# Patient Record
Sex: FEMALE | Race: WHITE | Employment: OTHER | URBAN - METROPOLITAN AREA
[De-identification: names, ages, dates, MRNs, and addresses within clinical notes are randomized per-mention and may not be internally consistent; named-entity substitution may affect disease eponyms.]

---

## 2017-06-08 DIAGNOSIS — I25.10 CAD (CORONARY ARTERY DISEASE): ICD-10-CM

## 2017-06-08 RX ORDER — TRIAMTERENE AND HYDROCHLOROTHIAZIDE 37.5; 25 MG/1; MG/1
1 CAPSULE ORAL DAILY
Qty: 30 CAPSULE | Refills: 0 | Status: SHIPPED | OUTPATIENT
Start: 2017-06-08 | End: 2023-07-31 | Stop reason: ALTCHOICE

## 2018-01-04 LAB
CHOLEST SERPL-MCNC: 179 MG/DL
HDLC SERPL-MCNC: 43 MG/DL
LDLC SERPL CALC-MCNC: 95 MG/DL
NONHDLC SERPL-MCNC: NORMAL MG/DL
TRIGL SERPL-MCNC: 205 MG/DL

## 2018-06-29 ENCOUNTER — TRANSFERRED RECORDS (OUTPATIENT)
Dept: HEALTH INFORMATION MANAGEMENT | Facility: CLINIC | Age: 63
End: 2018-06-29

## 2018-08-23 LAB
ANION GAP SERPL CALCULATED.3IONS-SCNC: 11 MMOL/L
BUN SERPL-MCNC: 17 MG/DL
CALCIUM SERPL-MCNC: 11 MG/DL
CHLORIDE SERPLBLD-SCNC: 103 MMOL/L
CO2 SERPL-SCNC: 27 MMOL/L
CREAT SERPL-MCNC: 0.97 MG/DL
GFR SERPL CREATININE-BSD FRML MDRD: 59 ML/MIN/1.73M2
GLUCOSE SERPL-MCNC: 114 MG/DL (ref 65–100)
POTASSIUM SERPL-SCNC: 3.6 MMOL/L
SODIUM SERPL-SCNC: 141 MMOL/L

## 2018-08-30 ENCOUNTER — TRANSFERRED RECORDS (OUTPATIENT)
Dept: HEALTH INFORMATION MANAGEMENT | Facility: CLINIC | Age: 63
End: 2018-08-30

## 2018-08-30 ENCOUNTER — HOSPITAL ENCOUNTER (INPATIENT)
Facility: CLINIC | Age: 63
LOS: 2 days | Discharge: HOME | DRG: 287 | End: 2018-09-01
Attending: EMERGENCY MEDICINE | Admitting: INTERNAL MEDICINE
Payer: COMMERCIAL

## 2018-08-30 ENCOUNTER — DOCUMENTATION ONLY (OUTPATIENT)
Dept: CARDIOLOGY | Facility: CLINIC | Age: 63
End: 2018-08-30

## 2018-08-30 ENCOUNTER — HOSPITAL ENCOUNTER (OUTPATIENT)
Dept: NUCLEAR MEDICINE | Facility: CLINIC | Age: 63
Setting detail: NUCLEAR MEDICINE
End: 2018-08-30
Attending: INTERNAL MEDICINE
Payer: COMMERCIAL

## 2018-08-30 ENCOUNTER — HOSPITAL ENCOUNTER (OUTPATIENT)
Dept: NUCLEAR MEDICINE | Facility: CLINIC | Age: 63
Setting detail: NUCLEAR MEDICINE
Discharge: HOME | End: 2018-08-30
Attending: INTERNAL MEDICINE | Admitting: INTERNAL MEDICINE
Payer: COMMERCIAL

## 2018-08-30 ENCOUNTER — APPOINTMENT (OUTPATIENT)
Dept: GENERAL RADIOLOGY | Facility: CLINIC | Age: 63
DRG: 287 | End: 2018-08-30
Attending: EMERGENCY MEDICINE
Payer: COMMERCIAL

## 2018-08-30 DIAGNOSIS — Z95.1 HISTORY OF CORONARY ARTERY BYPASS GRAFT: ICD-10-CM

## 2018-08-30 DIAGNOSIS — E21.3 HYPERPARATHYROIDISM (H): ICD-10-CM

## 2018-08-30 DIAGNOSIS — R07.9 ACUTE CHEST PAIN: ICD-10-CM

## 2018-08-30 DIAGNOSIS — Z98.61 STATUS POST PERCUTANEOUS TRANSLUMINAL CORONARY ANGIOPLASTY: ICD-10-CM

## 2018-08-30 DIAGNOSIS — E87.6 HYPOKALEMIA: Primary | ICD-10-CM

## 2018-08-30 LAB
ALBUMIN UR-MCNC: 10 MG/DL
ANION GAP SERPL CALCULATED.3IONS-SCNC: 7 MMOL/L (ref 3–14)
APPEARANCE UR: CLEAR
APTT PPP: >240 SEC (ref 22–37)
BACTERIA #/AREA URNS HPF: ABNORMAL /HPF
BASOPHILS # BLD AUTO: 0 10E9/L (ref 0–0.2)
BASOPHILS NFR BLD AUTO: 0.6 %
BILIRUB UR QL STRIP: NEGATIVE
BUN SERPL-MCNC: 14 MG/DL (ref 7–30)
CALCIUM SERPL-MCNC: 9.6 MG/DL (ref 8.5–10.1)
CHLORIDE SERPL-SCNC: 102 MMOL/L (ref 94–109)
CO2 SERPL-SCNC: 31 MMOL/L (ref 20–32)
COLOR UR AUTO: YELLOW
CREAT SERPL-MCNC: 0.87 MG/DL (ref 0.52–1.04)
D DIMER PPP FEU-MCNC: 0.3 UG/ML FEU (ref 0–0.5)
DIFFERENTIAL METHOD BLD: NORMAL
EOSINOPHIL # BLD AUTO: 0.1 10E9/L (ref 0–0.7)
EOSINOPHIL NFR BLD AUTO: 1.4 %
ERYTHROCYTE [DISTWIDTH] IN BLOOD BY AUTOMATED COUNT: 13.1 % (ref 10–15)
ERYTHROCYTE [DISTWIDTH] IN BLOOD BY AUTOMATED COUNT: 13.1 % (ref 10–15)
GFR SERPL CREATININE-BSD FRML MDRD: 66 ML/MIN/1.7M2
GLUCOSE SERPL-MCNC: 107 MG/DL (ref 70–99)
GLUCOSE UR STRIP-MCNC: NEGATIVE MG/DL
HCT VFR BLD AUTO: 40 % (ref 35–47)
HCT VFR BLD AUTO: 40.5 % (ref 35–47)
HGB BLD-MCNC: 13.8 G/DL (ref 11.7–15.7)
HGB BLD-MCNC: 14.1 G/DL (ref 11.7–15.7)
HGB UR QL STRIP: ABNORMAL
IMM GRANULOCYTES # BLD: 0 10E9/L (ref 0–0.4)
IMM GRANULOCYTES NFR BLD: 0.2 %
INR PPP: 1.07 (ref 0.86–1.14)
INTERPRETATION ECG - MUSE: NORMAL
INTERPRETATION ECG - MUSE: NORMAL
KETONES UR STRIP-MCNC: NEGATIVE MG/DL
LEUKOCYTE ESTERASE UR QL STRIP: ABNORMAL
LYMPHOCYTES # BLD AUTO: 2.1 10E9/L (ref 0.8–5.3)
LYMPHOCYTES NFR BLD AUTO: 32.4 %
MAGNESIUM SERPL-MCNC: 2.1 MG/DL (ref 1.6–2.3)
MAGNESIUM SERPL-MCNC: 2.3 MG/DL (ref 1.6–2.3)
MCH RBC QN AUTO: 30.1 PG (ref 26.5–33)
MCH RBC QN AUTO: 30.1 PG (ref 26.5–33)
MCHC RBC AUTO-ENTMCNC: 34.5 G/DL (ref 31.5–36.5)
MCHC RBC AUTO-ENTMCNC: 34.8 G/DL (ref 31.5–36.5)
MCV RBC AUTO: 86 FL (ref 78–100)
MCV RBC AUTO: 87 FL (ref 78–100)
MONOCYTES # BLD AUTO: 0.3 10E9/L (ref 0–1.3)
MONOCYTES NFR BLD AUTO: 5.3 %
MUCOUS THREADS #/AREA URNS LPF: PRESENT /LPF
NEUTROPHILS # BLD AUTO: 3.9 10E9/L (ref 1.6–8.3)
NEUTROPHILS NFR BLD AUTO: 60.1 %
NITRATE UR QL: NEGATIVE
NRBC # BLD AUTO: 0 10*3/UL
NRBC BLD AUTO-RTO: 0 /100
NT-PROBNP SERPL-MCNC: 34 PG/ML (ref 0–900)
PH UR STRIP: 6.5 PH (ref 5–7)
PLATELET # BLD AUTO: 238 10E9/L (ref 150–450)
PLATELET # BLD AUTO: 267 10E9/L (ref 150–450)
POTASSIUM SERPL-SCNC: 3 MMOL/L (ref 3.4–5.3)
RBC # BLD AUTO: 4.58 10E12/L (ref 3.8–5.2)
RBC # BLD AUTO: 4.69 10E12/L (ref 3.8–5.2)
RBC #/AREA URNS AUTO: 2 /HPF (ref 0–2)
SODIUM SERPL-SCNC: 140 MMOL/L (ref 133–144)
SOURCE: ABNORMAL
SP GR UR STRIP: 1.02 (ref 1–1.03)
SQUAMOUS #/AREA URNS AUTO: 3 /HPF (ref 0–1)
TROPONIN I SERPL-MCNC: <0.015 UG/L (ref 0–0.04)
TROPONIN I SERPL-MCNC: <0.015 UG/L (ref 0–0.04)
TSH SERPL DL<=0.005 MIU/L-ACNC: 1.61 MU/L (ref 0.4–4)
UROBILINOGEN UR STRIP-MCNC: NORMAL MG/DL (ref 0–2)
WBC # BLD AUTO: 6.5 10E9/L (ref 4–11)
WBC # BLD AUTO: 8.9 10E9/L (ref 4–11)
WBC #/AREA URNS AUTO: 11 /HPF (ref 0–5)

## 2018-08-30 PROCEDURE — 87086 URINE CULTURE/COLONY COUNT: CPT | Performed by: INTERNAL MEDICINE

## 2018-08-30 PROCEDURE — 71046 X-RAY EXAM CHEST 2 VIEWS: CPT

## 2018-08-30 PROCEDURE — 85730 THROMBOPLASTIN TIME PARTIAL: CPT | Performed by: INTERNAL MEDICINE

## 2018-08-30 PROCEDURE — 25000128 H RX IP 250 OP 636: Performed by: INTERNAL MEDICINE

## 2018-08-30 PROCEDURE — 25000132 ZZH RX MED GY IP 250 OP 250 PS 637

## 2018-08-30 PROCEDURE — 25000132 ZZH RX MED GY IP 250 OP 250 PS 637: Performed by: EMERGENCY MEDICINE

## 2018-08-30 PROCEDURE — A9516 IODINE I-123 SOD IODIDE MIC: HCPCS | Performed by: INTERNAL MEDICINE

## 2018-08-30 PROCEDURE — 85379 FIBRIN DEGRADATION QUANT: CPT | Performed by: EMERGENCY MEDICINE

## 2018-08-30 PROCEDURE — 96376 TX/PRO/DX INJ SAME DRUG ADON: CPT

## 2018-08-30 PROCEDURE — 83735 ASSAY OF MAGNESIUM: CPT | Performed by: INTERNAL MEDICINE

## 2018-08-30 PROCEDURE — 96365 THER/PROPH/DIAG IV INF INIT: CPT

## 2018-08-30 PROCEDURE — 25000125 ZZHC RX 250: Performed by: EMERGENCY MEDICINE

## 2018-08-30 PROCEDURE — 25000128 H RX IP 250 OP 636: Performed by: EMERGENCY MEDICINE

## 2018-08-30 PROCEDURE — 83880 ASSAY OF NATRIURETIC PEPTIDE: CPT | Performed by: EMERGENCY MEDICINE

## 2018-08-30 PROCEDURE — 93005 ELECTROCARDIOGRAM TRACING: CPT

## 2018-08-30 PROCEDURE — 84443 ASSAY THYROID STIM HORMONE: CPT | Performed by: INTERNAL MEDICINE

## 2018-08-30 PROCEDURE — 21000001 ZZH R&B HEART CARE

## 2018-08-30 PROCEDURE — 84484 ASSAY OF TROPONIN QUANT: CPT | Performed by: EMERGENCY MEDICINE

## 2018-08-30 PROCEDURE — 34300033 ZZH RX 343: Performed by: INTERNAL MEDICINE

## 2018-08-30 PROCEDURE — 83735 ASSAY OF MAGNESIUM: CPT | Performed by: EMERGENCY MEDICINE

## 2018-08-30 PROCEDURE — A9500 TC99M SESTAMIBI: HCPCS | Performed by: INTERNAL MEDICINE

## 2018-08-30 PROCEDURE — 85610 PROTHROMBIN TIME: CPT | Performed by: INTERNAL MEDICINE

## 2018-08-30 PROCEDURE — 99223 1ST HOSP IP/OBS HIGH 75: CPT | Mod: AI | Performed by: INTERNAL MEDICINE

## 2018-08-30 PROCEDURE — 85027 COMPLETE CBC AUTOMATED: CPT | Performed by: INTERNAL MEDICINE

## 2018-08-30 PROCEDURE — 36415 COLL VENOUS BLD VENIPUNCTURE: CPT | Performed by: INTERNAL MEDICINE

## 2018-08-30 PROCEDURE — 99285 EMERGENCY DEPT VISIT HI MDM: CPT | Mod: 25

## 2018-08-30 PROCEDURE — 81001 URINALYSIS AUTO W/SCOPE: CPT | Performed by: INTERNAL MEDICINE

## 2018-08-30 PROCEDURE — 78072 PARATHYRD PLANAR W/SPECT&CT: CPT

## 2018-08-30 PROCEDURE — 80048 BASIC METABOLIC PNL TOTAL CA: CPT | Performed by: EMERGENCY MEDICINE

## 2018-08-30 PROCEDURE — 93010 ELECTROCARDIOGRAM REPORT: CPT | Performed by: INTERNAL MEDICINE

## 2018-08-30 PROCEDURE — 93005 ELECTROCARDIOGRAM TRACING: CPT | Mod: 76

## 2018-08-30 PROCEDURE — 25000132 ZZH RX MED GY IP 250 OP 250 PS 637: Performed by: INTERNAL MEDICINE

## 2018-08-30 PROCEDURE — 84484 ASSAY OF TROPONIN QUANT: CPT | Performed by: INTERNAL MEDICINE

## 2018-08-30 PROCEDURE — 85025 COMPLETE CBC W/AUTO DIFF WBC: CPT | Performed by: EMERGENCY MEDICINE

## 2018-08-30 RX ORDER — NITROGLYCERIN 0.4 MG/1
0.4 TABLET SUBLINGUAL EVERY 5 MIN PRN
Status: DISCONTINUED | OUTPATIENT
Start: 2018-08-30 | End: 2018-08-30

## 2018-08-30 RX ORDER — POTASSIUM CL/LIDO/0.9 % NACL 10MEQ/0.1L
10 INTRAVENOUS SOLUTION, PIGGYBACK (ML) INTRAVENOUS
Status: DISCONTINUED | OUTPATIENT
Start: 2018-08-30 | End: 2018-09-01 | Stop reason: HOSPADM

## 2018-08-30 RX ORDER — POTASSIUM CHLORIDE 7.45 MG/ML
10 INJECTION INTRAVENOUS
Status: DISCONTINUED | OUTPATIENT
Start: 2018-08-30 | End: 2018-08-30

## 2018-08-30 RX ORDER — ASPIRIN 81 MG/1
81 TABLET ORAL DAILY
Status: DISCONTINUED | OUTPATIENT
Start: 2018-08-31 | End: 2018-08-31 | Stop reason: DRUGHIGH

## 2018-08-30 RX ORDER — ONDANSETRON 4 MG/1
4 TABLET, ORALLY DISINTEGRATING ORAL EVERY 6 HOURS PRN
Status: DISCONTINUED | OUTPATIENT
Start: 2018-08-30 | End: 2018-09-01 | Stop reason: HOSPADM

## 2018-08-30 RX ORDER — MAGNESIUM SULFATE HEPTAHYDRATE 40 MG/ML
4 INJECTION, SOLUTION INTRAVENOUS EVERY 4 HOURS PRN
Status: DISCONTINUED | OUTPATIENT
Start: 2018-08-30 | End: 2018-09-01 | Stop reason: HOSPADM

## 2018-08-30 RX ORDER — NALOXONE HYDROCHLORIDE 0.4 MG/ML
.1-.4 INJECTION, SOLUTION INTRAMUSCULAR; INTRAVENOUS; SUBCUTANEOUS
Status: DISCONTINUED | OUTPATIENT
Start: 2018-08-30 | End: 2018-09-01 | Stop reason: HOSPADM

## 2018-08-30 RX ORDER — ASPIRIN 81 MG/1
243 TABLET, CHEWABLE ORAL ONCE
Status: COMPLETED | OUTPATIENT
Start: 2018-08-30 | End: 2018-08-30

## 2018-08-30 RX ORDER — TRAMADOL HYDROCHLORIDE 50 MG/1
50 TABLET ORAL AT BEDTIME
Status: DISCONTINUED | OUTPATIENT
Start: 2018-08-30 | End: 2018-08-31

## 2018-08-30 RX ORDER — POTASSIUM CHLORIDE 1500 MG/1
20-40 TABLET, EXTENDED RELEASE ORAL
Status: DISCONTINUED | OUTPATIENT
Start: 2018-08-30 | End: 2018-08-30

## 2018-08-30 RX ORDER — ACETAMINOPHEN 500 MG
500 TABLET ORAL EVERY MORNING
COMMUNITY
End: 2023-09-12

## 2018-08-30 RX ORDER — POTASSIUM CHLORIDE 1.5 G/1.58G
20-40 POWDER, FOR SOLUTION ORAL
Status: DISCONTINUED | OUTPATIENT
Start: 2018-08-30 | End: 2018-08-30

## 2018-08-30 RX ORDER — POTASSIUM CL/LIDO/0.9 % NACL 10MEQ/0.1L
10 INTRAVENOUS SOLUTION, PIGGYBACK (ML) INTRAVENOUS
Status: DISCONTINUED | OUTPATIENT
Start: 2018-08-30 | End: 2018-08-30

## 2018-08-30 RX ORDER — ROSUVASTATIN CALCIUM 20 MG/1
20 TABLET, COATED ORAL EVERY EVENING
Status: DISCONTINUED | OUTPATIENT
Start: 2018-08-30 | End: 2018-09-01 | Stop reason: HOSPADM

## 2018-08-30 RX ORDER — NITROGLYCERIN 0.4 MG/1
0.4 TABLET SUBLINGUAL EVERY 5 MIN PRN
Status: DISCONTINUED | OUTPATIENT
Start: 2018-08-30 | End: 2018-09-01 | Stop reason: HOSPADM

## 2018-08-30 RX ORDER — ACETAMINOPHEN 650 MG/1
650 SUPPOSITORY RECTAL EVERY 4 HOURS PRN
Status: DISCONTINUED | OUTPATIENT
Start: 2018-08-30 | End: 2018-09-01 | Stop reason: HOSPADM

## 2018-08-30 RX ORDER — POTASSIUM CHLORIDE 1500 MG/1
40 TABLET, EXTENDED RELEASE ORAL ONCE
Status: COMPLETED | OUTPATIENT
Start: 2018-08-30 | End: 2018-08-30

## 2018-08-30 RX ORDER — METOPROLOL SUCCINATE 50 MG/1
50 TABLET, EXTENDED RELEASE ORAL DAILY
Status: DISCONTINUED | OUTPATIENT
Start: 2018-08-31 | End: 2018-09-01 | Stop reason: HOSPADM

## 2018-08-30 RX ORDER — ONDANSETRON 2 MG/ML
4 INJECTION INTRAMUSCULAR; INTRAVENOUS EVERY 6 HOURS PRN
Status: DISCONTINUED | OUTPATIENT
Start: 2018-08-30 | End: 2018-09-01 | Stop reason: HOSPADM

## 2018-08-30 RX ORDER — POTASSIUM CHLORIDE 7.45 MG/ML
10 INJECTION INTRAVENOUS
Status: DISCONTINUED | OUTPATIENT
Start: 2018-08-30 | End: 2018-09-01 | Stop reason: HOSPADM

## 2018-08-30 RX ORDER — LIDOCAINE 40 MG/G
CREAM TOPICAL
Status: DISCONTINUED | OUTPATIENT
Start: 2018-08-30 | End: 2018-09-01 | Stop reason: HOSPADM

## 2018-08-30 RX ORDER — CLOPIDOGREL BISULFATE 75 MG/1
75 TABLET ORAL EVERY EVENING
Status: DISCONTINUED | OUTPATIENT
Start: 2018-08-30 | End: 2018-09-01 | Stop reason: HOSPADM

## 2018-08-30 RX ORDER — ROSUVASTATIN CALCIUM 20 MG/1
20 TABLET, COATED ORAL EVERY EVENING
COMMUNITY
End: 2018-09-17

## 2018-08-30 RX ORDER — ASPIRIN 325 MG
325 TABLET ORAL ONCE
Status: DISCONTINUED | OUTPATIENT
Start: 2018-08-30 | End: 2018-08-30

## 2018-08-30 RX ORDER — TRAMADOL HYDROCHLORIDE 50 MG/1
50 TABLET ORAL AT BEDTIME
COMMUNITY
End: 2024-08-06

## 2018-08-30 RX ORDER — POTASSIUM CHLORIDE 1.5 G/1.58G
20-40 POWDER, FOR SOLUTION ORAL
Status: DISCONTINUED | OUTPATIENT
Start: 2018-08-30 | End: 2018-09-01 | Stop reason: HOSPADM

## 2018-08-30 RX ORDER — ASPIRIN 81 MG/1
TABLET, CHEWABLE ORAL
Status: COMPLETED
Start: 2018-08-30 | End: 2018-08-30

## 2018-08-30 RX ORDER — ACETAMINOPHEN 500 MG
500 TABLET ORAL EVERY MORNING
Status: DISCONTINUED | OUTPATIENT
Start: 2018-08-31 | End: 2018-09-01 | Stop reason: HOSPADM

## 2018-08-30 RX ORDER — CLOPIDOGREL BISULFATE 75 MG/1
75 TABLET ORAL EVERY EVENING
COMMUNITY

## 2018-08-30 RX ORDER — POTASSIUM CHLORIDE 1500 MG/1
20-40 TABLET, EXTENDED RELEASE ORAL
Status: DISCONTINUED | OUTPATIENT
Start: 2018-08-30 | End: 2018-09-01 | Stop reason: HOSPADM

## 2018-08-30 RX ORDER — POTASSIUM CHLORIDE 29.8 MG/ML
20 INJECTION INTRAVENOUS
Status: DISCONTINUED | OUTPATIENT
Start: 2018-08-30 | End: 2018-09-01 | Stop reason: HOSPADM

## 2018-08-30 RX ORDER — POTASSIUM CHLORIDE 750 MG/1
10 TABLET, EXTENDED RELEASE ORAL EVERY OTHER DAY
Status: ON HOLD | COMMUNITY
End: 2018-09-01

## 2018-08-30 RX ORDER — ACETAMINOPHEN 325 MG/1
650 TABLET ORAL EVERY 4 HOURS PRN
Status: DISCONTINUED | OUTPATIENT
Start: 2018-08-30 | End: 2018-09-01 | Stop reason: HOSPADM

## 2018-08-30 RX ORDER — METOPROLOL SUCCINATE 50 MG/1
50 TABLET, EXTENDED RELEASE ORAL DAILY
COMMUNITY
End: 2018-09-17

## 2018-08-30 RX ORDER — MAGNESIUM SULFATE HEPTAHYDRATE 40 MG/ML
4 INJECTION, SOLUTION INTRAVENOUS EVERY 4 HOURS PRN
Status: DISCONTINUED | OUTPATIENT
Start: 2018-08-30 | End: 2018-08-30

## 2018-08-30 RX ORDER — POTASSIUM CHLORIDE 29.8 MG/ML
20 INJECTION INTRAVENOUS
Status: DISCONTINUED | OUTPATIENT
Start: 2018-08-30 | End: 2018-08-30

## 2018-08-30 RX ADMIN — POTASSIUM CHLORIDE 40 MEQ: 1500 TABLET, EXTENDED RELEASE ORAL at 17:01

## 2018-08-30 RX ADMIN — ASPIRIN 81 MG 243 MG: 81 TABLET ORAL at 15:37

## 2018-08-30 RX ADMIN — ROSUVASTATIN CALCIUM 20 MG: 20 TABLET, FILM COATED ORAL at 22:38

## 2018-08-30 RX ADMIN — NITROGLYCERIN 0.4 MG: 0.4 TABLET SUBLINGUAL at 16:03

## 2018-08-30 RX ADMIN — Medication 955 UCI.: at 11:02

## 2018-08-30 RX ADMIN — TRAMADOL HYDROCHLORIDE 50 MG: 50 TABLET, COATED ORAL at 22:38

## 2018-08-30 RX ADMIN — ASPIRIN 243 MG: 81 TABLET, CHEWABLE ORAL at 15:37

## 2018-08-30 RX ADMIN — LIDOCAINE HYDROCHLORIDE 30 ML: 20 SOLUTION ORAL; TOPICAL at 19:09

## 2018-08-30 RX ADMIN — Medication 26.7 MILLICURIE: at 13:13

## 2018-08-30 RX ADMIN — HEPARIN SODIUM 700 UNITS/HR: 10000 INJECTION, SOLUTION INTRAVENOUS at 20:38

## 2018-08-30 RX ADMIN — Medication 3600 UNITS: at 20:39

## 2018-08-30 RX ADMIN — CLOPIDOGREL 75 MG: 75 TABLET, FILM COATED ORAL at 22:38

## 2018-08-30 ASSESSMENT — ENCOUNTER SYMPTOMS
DIAPHORESIS: 0
SHORTNESS OF BREATH: 0
NAUSEA: 0

## 2018-08-30 ASSESSMENT — PAIN DESCRIPTION - DESCRIPTORS: DESCRIPTORS: DISCOMFORT

## 2018-08-30 NOTE — PROGRESS NOTES
RECEIVING UNIT ED HANDOFF REVIEW    ED Nurse Handoff Report was reviewed by: Nelly Barton on August 30, 2018 at 6:41 PM

## 2018-08-30 NOTE — PHARMACY-ADMISSION MEDICATION HISTORY
Admission Medication History    Admission medication history interview status for the 8/30/2018 admission is complete. See EPIC admission navigator for prior to admission medications     Medication history source reliability:Good    Actions taken by pharmacist (provider contacted, etc):None     Additional medication history information not noted on PTA med list :None    Medication reconciliation/reorder completed by provider prior to medication history? No    Time spent in this activity: 15 minutes      Prior to Admission medications    Medication Sig Last Dose Taking? Auth Provider   acetaminophen (TYLENOL) 500 MG tablet Take 500 mg by mouth every morning 8/30/2018 at AM Yes Unknown, Entered By History   aspirin 81 MG tablet Take 81 mg by mouth daily 8/30/2018 at AM Yes Unknown, Entered By History   clopidogrel (PLAVIX) 75 MG tablet Take 75 mg by mouth every evening 8/29/2018 at PM Yes Unknown, Entered By History   furosemide (LASIX) 20 MG tablet Take 40 mg by mouth daily  8/30/2018 at AM Yes Reported, Patient   metoprolol succinate (TOPROL-XL) 50 MG 24 hr tablet Take 50 mg by mouth daily 8/30/2018 at AM Yes Unknown, Entered By History   potassium chloride (K-TAB,KLOR-CON) 10 MEQ tablet Take 10 mEq by mouth every other day in the PM 8/28/2018 at PM Yes Unknown, Entered By History   rosuvastatin (CRESTOR) 20 MG tablet Take 20 mg by mouth every evening  8/29/2018 at PM Yes Unknown, Entered By History   traMADol (ULTRAM) 50 MG tablet Take 50 mg by mouth At Bedtime  Yes Unknown, Entered By History   triamterene-hydrochlorothiazide (DYAZIDE) 37.5-25 MG per capsule Take 1 capsule by mouth daily 8/29/2018 at PM Yes Tony Mcconnell MD David S. Cline, PharmD

## 2018-08-30 NOTE — H&P
[facility]    History and Physical  Hospitalist       Date of Admission:  8/30/2018    Assessment & Plan   Radhika Reynolds is a 62 year old female wit hx of complex CAD (s/p multiple stenting before and after IV CABG), HTN, intermitent hypokalemia, new dx of hypercalcemia/hyperparathyroidism, Thorasic spinal compression fx (on ultram at bedtime) ,GERD who presents with new onset chest pain.     Principal Problem:    Acute chest pain  Coronary artery disease    Assessment: complex CAD dz hx. Per pt hx of cardiac stenting 2011 ([facility]), 2012 ([facility]), 1V CABG ([facility]) and 3 stents in [place]. Had rest night cp similar to today's presentation with all episodes.   Now presents with intermitent rest pain at night and today with constant fluctuating pain all day.  Troponin negative.   Work up thus far negative  Given hx possible ACS but need to consider GERD.  Some improvement with GI cocktail just given and pain eventually resolved.   I suspect this is not cardiac but will treat for possible ACS given complex cardiac hx and pain same as on prior ACS presentations  No contraindications to heparin      Plan:   Admit in patient [facility]  Tele  Heparin gtt for now - stop if rules out for AMI  Lexiscan tomorrow  bber  Asa, plavix  Statin  Am lipids  Repeat ekg  If not completely resolve with gi cocktail will try sl ntg    Gerd: told by pcp recently to restart ppi. Has night time gerd different from her cp.   Some improvement with cp now in ED with gi cocktail.     Start omeprezole given pt on plavix          Hyperlipidemia    Assessment: on statin    Plan: cont statin, am lipiids, goal LDL <70      Essential hypertension, benign    Assessment: good control now and in recent PCP office visit. On PTA toprol xl 50mg every day, dyazide (and lasix - lasix may be for htn or leg jd- not clear. No clear hx of chf)    Plan: cont toprol xl.   Restart dyazide, replace k        Hypokalemia    Assessment: hx of intermitent hypokalemia  in past- even had hospitalization for this in past. On dyazide AND lasix. On kcl 10meq every other day but has been on every day in past.     Plan:  K and Mg protocol  Check Mg  Hold lasix  Replace K  Increase outpatient dosing kcl to every day -consider increase dose to 20meq every day.  Close pcp follow up.       Possible UTI  +symptoms. UA slightly abnl, ++ subcutaneous epi   ucx  Start cipro    DVT Prophylaxis: Pneumatic Compression Devices    Code Status: Full Code    Disposition: Expected discharge in 1-2 days once cp evaluation completed.     Giorgio Bowen MD    Primary Care Physician   = Carlos Edwards    Chief Complaint   Chest pain    History is obtained from the patient    History of Present Illness   Radhika Reynolds is a 62 year old female wit hx of complex CAD (s/p multiple stenting before and after IV CABG), HTN, intermitent hypokalemia, new dx of hypercalcemia/hyperparathyroidism, Thorasic spinal compression fx (on ultram at bedtime) ,GERD who presents with new onset chest pain.     Cardiac hx: see cardiology note dated 8/2015.   history of coronary artery disease and s/p drug-eluting stent placement to the LAD and obtuse marginal.  In 02/2013 s/p minimally invasive CABG x1 with LIMA to the LAD - complicated postoperatively by significant chest discomfort, consistent with a post-pericardiotomy syndrome -she was treated with colchicine, steroids and aspirin.      Pt states she has been doing well since cardiac stents placed in [place] in 2/2018 until two days ago. She states that the night before last she was awaken with moderate left sided chest pain that was sharp and pressure like that resolved after 1 hour spontaneously. No associated symptoms of sob, nausea, diaphoresis, palpitions, gerd or abd pain. No radiation.  The following day, yesterday, she felt fine. The chest pain was similar to the cp she has had in past when she has ultimately had cardiac stenting and before her CABG.   Last night  she was awoken X 2. First episode she had her typical gerd and also at same time the cp she had last night before. She took and OTC med (gavisol) for her gerd and both pains resolved about one hour later. She was awoken again later in the night with her cp which is different from her GERD. This resolved spontaneously or she fell asleep again. She awoke this am with her chest pain (non gerd pain) which has been constant since all day, fluctuating in intensity. Not worse with position changes. No associated n/diaphoresis, sob. No radiation. Not worse with excertion.  She was having a parathyroid gland NM scan today at [facility] and given symptoms she decided to present to ED.   In ED, she was moderatly hypertensive, afebrile. Nl sats. Nl exam. ekg without acute changes suggestive of ischemia.  K 3.0. Cbc nl. ddimer nl.  cxr normal. Troponin nl.   Her pain was improved with SL ntg. She has had mild persistence of her cp in the ED.       Past Medical History    I have reviewed this patient's medical history and updated it with pertinent information if needed.   Past Medical History:   Diagnosis Date     Chest pain      Coronary artery disease     CABG 3/15/2013: CARTY to LAD, cath 2/12/2012: MAXIMO to LAD and MAXIMO to OM     Dyslipidemia      Gastro-oesophageal reflux disease      High cholesterol      History of angina      Hypertension      Hypokalemia      Pericarditis, acute March 2013    unspecified       Past Surgical History   I have reviewed this patient's surgical history and updated it with pertinent information if needed.  Past Surgical History:   Procedure Laterality Date     BIOPSY       CARDIAC SURGERY       CARDIAC SURGERY       CORONARY ARTERY BYPASS  3/15/2013     DAVINCI BYPASS ARTERY CORONARY  3/15/2013    Procedure: DAVINCI BYPASS ARTERY CORONARY;  Davinci Assisted  Coronary Artery Bypass Graft X 1 using Left Internal Mammary Artery off pump;  Surgeon: Alejandro Mcclendon MD;  Location: [facility] OR     GYN  SURGERY       HEART CATH, ANGIOPLASTY  2/2012    MAXIMO to the LAD and obtuse marginal     LIPOSUCTION (LOCATION)         Prior to Admission Medications   Prior to Admission Medications   Prescriptions Last Dose Informant Patient Reported? Taking?   acetaminophen (TYLENOL) 500 MG tablet 8/30/2018 at AM  Yes Yes   Sig: Take 500 mg by mouth every morning   aspirin 81 MG tablet 8/30/2018 at AM Self Yes Yes   Sig: Take 81 mg by mouth daily   clopidogrel (PLAVIX) 75 MG tablet 8/29/2018 at PM Self Yes Yes   Sig: Take 75 mg by mouth every evening   furosemide (LASIX) 20 MG tablet 8/30/2018 at AM Self Yes Yes   Sig: Take 40 mg by mouth daily    metoprolol succinate (TOPROL-XL) 50 MG 24 hr tablet 8/30/2018 at AM Self Yes Yes   Sig: Take 50 mg by mouth daily   potassium chloride (K-TAB,KLOR-CON) 10 MEQ tablet 8/28/2018 at PM Self Yes Yes   Sig: Take 10 mEq by mouth every other day in the PM   rosuvastatin (CRESTOR) 20 MG tablet 8/29/2018 at PM Self Yes Yes   Sig: Take 20 mg by mouth every evening    traMADol (ULTRAM) 50 MG tablet   Yes Yes   Sig: Take 50 mg by mouth At Bedtime   triamterene-hydrochlorothiazide (DYAZIDE) 37.5-25 MG per capsule 8/29/2018 at PM Self No Yes   Sig: Take 1 capsule by mouth daily      Facility-Administered Medications: None     Allergies   Allergies   Allergen Reactions     Amoxicillin      Lisinopril Cough     Norvasc [Amlodipine]      Penicillin G        Social History   I have reviewed this patient's social history and updated it with pertinent information if needed. Radhika Reynolds  reports that she quit smoking about 5 years ago. Her smoking use included Cigarettes. She has a 7.50 pack-year smoking history. She does not have any smokeless tobacco history on file. She reports that she does not drink alcohol or use illicit drugs.    Family History   I have reviewed this patient's family history and updated it with pertinent information if needed.   Family History   Problem Relation Age of Onset      Hypertension Mother      Other - See Comments Mother      back problems     Myocardial Infarction Father        Review of Systems   The 10 point Review of Systems is negative other than noted in the HPI or here.     Physical Exam   Temp: 97.7  F (36.5  C) Temp src: Temporal BP: 126/65   Heart Rate: 55 Resp: 11 SpO2: 99 % O2 Device: None (Room air)    Vital Signs with Ranges  Temp:  [97.7  F (36.5  C)] 97.7  F (36.5  C)  Heart Rate:  [55-70] 55  Resp:  [11-18] 11  BP: ()/() 126/65  SpO2:  [96 %-100 %] 99 %  163 lbs 0 oz    Constitutional: laying in bed, appears comfortable. Nad, non toxic   Eyes: perrla, eomi, nl sclera  Neck NT, nlthyroid  HEENT: nl op  Respiratory: breathing easily, CTAB  Chest: no reproduction of cp with palpation  Cardiovascular: RRR no r/g/m, not tachy  No edema  Nl pedal pulses  GI: soft, nd. ? Mild tenderness over suprapubic region. No guarding  Lymph/Hematologic: no axillary or inguinal lad    Skin: no rash  Musculoskeletal: no focal spine pain. No focal jt swelling or redness.   Neurologic: CN 2-12 grossly intact,. Nl speech and mentation. Strength 5/5 extrem X 4. No clonus. Toes down going bilaterally. Nl dorso./plantar flexion  Psychiatric:nl affect    Data   Data reviewed today:  I personally reviewed imaging and labs and ekg   Ekg: reviewed. NSR. Nl intervals. No ST-T wave changes    Recent Labs  Lab 08/30/18  1525   WBC 6.5   HGB 13.8   MCV 87         POTASSIUM 3.0*   CHLORIDE 102   CO2 31   BUN 14   CR 0.87   ANIONGAP 7   ADONIS 9.6   *   TROPI <0.015       Imaging:  Recent Results (from the past 24 hour(s))   NM Parathy Plnr Img w True Spect & CT    Narrative    NUCLEAR MEDICINE PARATHYROID DUAL ISOTOPE WITH TRUE (SPECT) AND CT   8/30/2018 3:08 PM    HISTORY:  Hyperparathyroidism (H).    COMPARISON: None.    TECHNIQUE:  The patient received I-123 orally and Tc-99 Cardiolite  injected intravenously. Dynamic images were obtained of the thyroid  gland  following Cardiolite administration. Iodine images were obtained  of the thyroid with subtraction from the Cardiolite images. SPECT CT  images obtained for possible localization.    DOSE: 955uCi I-123 Caps P.O. @1055, 26.7mCi Tc99m Sestamibi inj Rt  Hand    FINDINGS:  The images of the thyroid gland on both the I-123 and  Cardiolite images demonstrate homogeneous appearance of the thyroid  gland. The subtraction images demonstrate no persistent area of  increased radioisotope uptake to indicate a functioning parathyroid  adenoma.    Severe coronary atherosclerosis is noted.      Impression    IMPRESSION:  No evidence of persistent radioisotope uptake on  subtraction imaging to indicate a functioning parathyroid adenoma.  Severe coronary atherosclerosis noted.    RANDALL PUENTE MD   XR Chest 2 Views    Narrative    CHEST TWO VIEWS 8/30/2018 5:12 PM     HISTORY: Chest pain.      COMPARISON: August 16, 2015     FINDINGS: There are no acute infiltrates. The cardiac silhouette is  not enlarged. Pulmonary vasculature is unremarkable.      Impression    IMPRESSION: No acute disease.    CLIFF PAGAN MD

## 2018-08-30 NOTE — ED PROVIDER NOTES
"  History     Chief Complaint:  Chest Pain    The history is provided by the patient.     Radhika Reynolds is a 62 year old female with a history of CAD s/p CABG 2013 and multiple stents (last 3 placed February 2018) on 81 mg ASA and Plavix and GERD who presents for evaluation of chest pain. Patient reports she has been doing well since stent placement 6 months ago. However, she awoke from sleep with chest pain 2 nights ago. She reports her anginal pain is typically at rest and typically at night. However, she thought this may have been heartburn because it improved spontaneously. She then had 2 episodes last night of awakening due to chest pain. She did not awake with pain this morning and proceeded to the hospital for parathyroid testing for hypercalcemia. She was concerned when initial BP was high at 165/83, though this improved after the scan. She reports at some point she developed chest pain again (initially she states it was when walking to her car then later states she had pain prior to exerting herself walking to the car). She reports the pain is in the left chest and pressure-like in quality. Currently mild. It radiates to her left scapula with inspiration. Because she felt \"something was wrong\" she went to her cardiologist's office who recommended she come to the ER. It is of note that patient has not seen this cardiologist in 3 years as her recent tests and procedures have been performed in [place] ([facility] [place]). She denies associated diaphoresis, nausea, vomiting, or dyspnea. No recent travel (in [place] May- October).     Allergies:  Amoxicillin  Lisinopril  Norvasc  Penicillin G     Medications:    Aspirin  Plavix  Lasix  Metoprolol succinate  Potassium chloride  Crestor  Dyazide    Past Medical History:    Coronary artery disease  Hyperlipidemia  Gastro-oseophageal reflux disease  Angina  Hypertension  Hypokalemia  Pericarditis, acute    Past Surgical History:  " "  Biopsy  Cardiac surgery   Davinci bypass artery coronary  GYN surgery  Heart cath, angioplasty, MAXIMO to the LED and obtuse marginal  Liposuction    Family History:    Hypertension  Myocardial infarction     Social History:  Relationship status:   Tobacco use:  Former, 0.25 PPD. Quit date 3/23/2013  Alcohol use: No  The patient presents with .   Marital Status:       Review of Systems   Constitutional: Negative for diaphoresis.   Respiratory: Negative for shortness of breath.    Cardiovascular: Positive for chest pain.   Gastrointestinal: Negative for nausea and vomiting.   All other systems reviewed and are negative.    Physical Exam     Patient Vitals for the past 24 hrs:   BP Temp Temp src Heart Rate Resp SpO2 Height Weight   08/30/18 1730 - - - 55 - 99 % - -   08/30/18 1723 126/65 - - - - - - -   08/30/18 1700 98/77 - - 61 - 97 % - -   08/30/18 1630 113/71 - - 60 11 97 % - -   08/30/18 1615 111/68 - - 62 - 96 % - -   08/30/18 1600 135/84 - - 61 11 99 % - -   08/30/18 1530 (!) 125/109 - - 62 - 100 % - -   08/30/18 1515 (!) 152/93 - - 58 - 99 % - -   08/30/18 1445 161/89 97.7  F (36.5  C) Temporal 70 18 100 % 1.575 m (5' 2\") 73.9 kg (163 lb)     Physical Exam  General: Well-developed and well-nourished. Well appearing middle aged woman. Cooperative.  Head:  Atraumatic.  Eyes:  Conjunctivae, lids, and sclerae are normal.  ENT:    Normal nose. Moist mucous membranes.  Neck:  Supple. Normal range of motion.  CV:  Regular rate and rhythm. Normal heart sounds with no murmurs, rubs, or gallops detected. 2+ DP bilaterally.  Resp:  No respiratory distress. Clear to auscultation bilaterally without decreased breath sounds, wheezing, rales, or rhonchi.  GI:  Soft. Non-distended. Non-tender.    MS:  Normal ROM. No bilateral lower extremity edema or calf tenderness..  Skin:  Warm. Non-diaphoretic. No pallor.  Neuro:  Awake. A&Ox3. Normal strength.  Psych: Normal mood and affect. Normal speech.  Vitals " reviewed.    Emergency Department Course     EKG #1 of 2  Indication: Chest Pain  Time: 1444  Rate 65 bpm. MN interval 198. QRS duration 82. QT/QTc 404/420.   Normal sinus rhythm   Cannot rule out Anterior infarct, age undetermined  No acute ST changes.  No significant change as compared to prior, dated 8/16/15     EKG #2 of 2  Indication: Chest Pain  Time: 1552  Rate 57 bpm. MN interval 216. QRS duration 92. QT/QTc 436/424.   Sinus bradycardia with 1st degree AV block  Septal infarct, age undetermined  No acute ST changes.  No significant change as compared to prior, dated 8/30/18    Imaging:  Radiology findings were communicated with the patient who voiced understanding of the findings.  XR Chest 2 Views  IMPRESSION:  No acute disease.  Reading per radiology.     Laboratory:  Laboratory findings were communicated with the patient who voiced understanding of the findings.  BNP: 34  Troponin (Collected 1525): <0.015  BMP: Potassium: 3.0(L), Glucose: 107(H) o/w WNL (Creatinine 0.87)   D Dimer (Collected 1525): 0.3   CBC: AWNL (WBC 6.5, HGB 13.8, )  Magnesium: 2.1    Interventions:  1537 Aspirin 243 mg Oral  1603 Nitroglycerin 0.4 mg Sublingual  1701 Potassium chloride 40 mEq Oral     Emergency Department Course:  Nursing notes and vitals reviewed.  I performed an exam of the patient as documented above.   EKG obtained in the ED, see results above.    IV was inserted and blood was drawn for laboratory testing, results above.  The patient was sent for a XR while in the emergency department, results above.     1728 I rechecked the patient. Reports pain improved after nitroglycerin.  1747 I spoke with Dr. Bowen, hospitalist, regarding the patient.  1930 I rechecked the patient. Pain again somewhat improved, though not resolved, after GI cocktail.    I discussed the treatment plan with the patient. She expressed understanding of this plan and consented to admission. I discussed the patient with Dr. Bowen, who  will admit the patient to a monitored bed for further evaluation and treatment.   I personally reviewed the laboratory and imaging results with the patient and answered all related questions prior to admission.      HEART Score  Background  Calculates the overall risk of adverse event in patient's presenting with chest pain.  Based on 5 criteria (each assigned 0-2 points) including suspiciousness of history, EKG, age, risk factors and troponin.    Data  62 year old female  Chest pain of pericarditis  Hyperlipidemia  Essential hypertension, benign  Coronary artery disease  Precordial pain  Acute pericarditis  Personal history of tobacco use, presenting hazards to health  Former smoker  FHx includes father with MI  Lab Results   Component Value Date    TROPI <0.015 08/30/2018     Criteria   0-2 points for each of 5 items (maximum of 10 points):  Score 0- History slightly suspicious for coronary syndrome   Score 1- EKG with Non-specific repolarization disturbance  Score 1- Age 45 to 65 years old  Score 2- Three or more risk factors for or history of atherosclerotic disease  Score 0- Within normal limits for troponin levels  Interpretation  Risk of adverse outcome  Heart Score: 4  Total Score 4-6- Adverse Outcome Risk 20.3% - Supports admission with standard rule-out management -serial troponins and stress testing    Impression & Plan      Medical Decision Making:  Radhika is a 62-year-old female with a strong history of coronary artery disease with a total of 6 stents (last 3 placed 6 months ago) and CABG who presents with chest pain.  Patient states her typical angina occurs at rest and typically at night.  She has had some chest pain over the last 2 nights that resolved spontaneously.  However, today she has had pain in her left chest and radiating to her left scapula, worse with inspiration, prompting her visit.  On exam, she is well-appearing and has no focal physical exam findings.  EKG has some nonspecific changes  that are grossly unchanged from prior dated 2015.  Patient was given the remainder of a full dose of aspirin and 1 nitroglycerin.  She states her pain improved with nitroglycerin and declined need for further.  Chest x-ray is unremarkable with no cause of her left-sided chest pain identified.  Her laboratory studies are overall reassuring with no leukocytosis or anemia.  She has a mild hypokalemia to 3.0 which was repleted with 40 mEq potassium chloride by mouth.  Creatinine normal at 0.87.  D-dimer is 0.3.  This negative value makes pulmonary embolus as the etiology of her pain highly unlikely.  Finally, troponin is negative which is reassuring for ACS.  Given patient's strong history of coronary artery disease with recurrent left sided chest pain, relieved with nitro, I am concerned for unstable angina.  Her HEART score is 4 and I believe she warrants admission to the hospital for further workup and treatment.  It is of note that patient states she had a stress test within the last 6 months.  However, I attempted to get this information from Care Everywhere and was unable to do so. I discussed patient's case with Dr. Bowen, hospitalist, who accepts admission.  He evaluated the patient in the emergency department and her pain had returned by that point.  We tried a GI cocktail as she describes it as somewhat burning in nature and this minimally improved her pain.  She reports chest pain seems to be waxing and waning. Thus, per Dr. Bowen's recommendation, she was started on a heparin drip given her CAD history.  I updated patient on the results of her laboratory and imaging studies and answered all her questions.  She verbalized understanding of these findings as well as plan for admission.  All of her questions were answered.    Diagnosis:    ICD-10-CM    1. Acute chest pain R07.9    2. History of coronary artery bypass graft Z95.1    3. Status post percutaneous transluminal coronary angioplasty Z98.61        Disposition:   Admitted to [facility].    CMS Diagnoses: None     Scribe Disclosure:  I, Inés Luis, am serving as a scribe at 3:17 PM on 8/30/2018 to document services personally performed by Pallavi Mackey MD, based on my observations and the provider's statements to me.     Inés Smith  8/30/2018    EMERGENCY DEPARTMENT       Pallavi Mackey MD  08/31/18 0052

## 2018-08-30 NOTE — ED NOTES
[facility]  ED Nurse Handoff Report    ED Chief complaint: Chest Pain (Past few days. Radiates to her back.  3 stents placed in [place] 6 months ago.  )      ED Diagnosis:   Final diagnoses:   Acute chest pain   History of coronary artery bypass graft   Status post percutaneous transluminal coronary angioplasty       Code Status: Full Code    Allergies:   Allergies   Allergen Reactions     Amoxicillin      Lisinopril Cough     Norvasc [Amlodipine]      Penicillin G        Activity level - Baseline/Home:  Independent    Activity Level - Current:   Independent     Needed?: No    Isolation: No  Infection: Not Applicable  Bariatric?: No    Vital Signs:   Vitals:    08/30/18 1630 08/30/18 1700 08/30/18 1723 08/30/18 1730   BP: 113/71 98/77 126/65    Resp: 11      Temp:       TempSrc:       SpO2: 97% 97%  99%   Weight:       Height:           Cardiac Rhythm: ,        Pain level: 0-10 Pain Scale: 5    Is this patient confused?: No   [place] - Suicide Severity Rating Scale Completed?  Yes  If yes, what color did the patient score?  White    Patient Report: Initial Complaint: chest pain  Focused Assessment: Chest pain for the past two night, hx 6 stents, CABG in 2013, GERD, Hypokalemia, CAD and HTN.  Last night the pain woke her twice, thinking it was indigstion, was seen today for her thyroid scan, left her appointment and felt that something was wrong, spoke with cardiology clinic and was instructed to ED.  Pain radiates from left chest to left shoulder blade, no N/V or diaphoresis.  Denies SOB.  Some of her stents were placed in a [place] hospital others done here.  PIV 18 G right AC.  Tests Performed: CXR, labs, EKG  Abnormal Results: see results  Treatments provided:  mg, nitroglycerin x1 SL    Family Comments:  at bedside    OBS brochure/video discussed/provided to patient: yes    ED Medications:   Medications   nitroGLYcerin (NITROSTAT) sublingual tablet 0.4 mg (0.4 mg  Sublingual Given 8/30/18 1603)   aspirin chewable tablet 243 mg (243 mg Oral Given 8/30/18 1537)   potassium chloride SA (K-DUR/KLOR-CON M) CR tablet 40 mEq (40 mEq Oral Given 8/30/18 1701)       Drips infusing?:  No    For the majority of the shift this patient was Green.   Interventions performed were none.    Severe Sepsis OR Septic Shock Diagnosis Present: No      ED NURSE PHONE NUMBER: [phone]

## 2018-08-30 NOTE — IP AVS SNAPSHOT
[facility]    [address] [place], [place] [address]e., [address]    [place] [address]    Phone:  [phone]                                       After Visit Summary   8/30/2018    Radhika Reynolds    MRN: [id number]           After Visit Summary Signature Page     I have received my discharge instructions, and my questions have been answered. I have discussed any challenges I see with this plan with the nurse or doctor.    ..........................................................................................................................................  Patient/Patient Representative Signature      ..........................................................................................................................................  Patient Representative Print Name and Relationship to Patient    ..................................................               ................................................  Date                                            Time    ..........................................................................................................................................  Reviewed by Signature/Title    ...................................................              ..............................................  Date                                                            Time          22EPIC Rev 08/18

## 2018-08-30 NOTE — IP AVS SNAPSHOT
MRN:[id number]                      After Visit Summary   8/30/2018    Radhika Reynolds    MRN: [id number]           Thank you!     Thank you for choosing [facility] for your care. Our goal is always to provide you with excellent care. Hearing back from our patients is one way we can continue to improve our services. Please take a few minutes to complete the written survey that you may receive in the mail after you visit with us. Thank you!        Patient Information     Date Of Birth          1955        Designated Caregiver       Most Recent Value    Caregiver    Will someone help with your care after discharge? yes    Name of designated caregiver Constantino Reynolds [spouse]    Phone number of caregiver [phone]    Caregiver address [place]      About your hospital stay     You were admitted on:  August 30, 2018 You last received care in the:  [facility]    You were discharged on:  September 1, 2018       Who to Call     For medical emergencies, please call 911.  For non-urgent questions about your medical care, please call your primary care provider or clinic, [phone]          Attending Provider     Provider Specialty    Pallavi Mackey MD Emergency Medicine    [facility], Giorgio Thakkar MD Internal Medicine       Primary Care Provider Office Phone # Fax #    Carlos Edwards [phone]      After Care Instructions     Diet       Follow this diet upon discharge: Orders Placed This Encounter      Kosher Diet, < 2g sodium daily                  Follow-up Appointments     Follow-up and recommended labs and tests        Keep previously scheduled cardiology appointments.  F/U with PCP in one week.                  Your next 10 appointments already scheduled     Sep 06, 2018  1:30 PM CDT   CONSULT with Dakotah Ingram MD   [facility] [place] [facility] [place]    [address], [address]  [place] [address]    [phone]              Additional Services     Follow-Up with Cardiac Advanced Practice Provider                 Further instructions from your care team       Going Home after  ANGIOGRAM       Name: Radhika Reynolds  Medical Record Number:  [id number]  Today's Date: September 1, 2018        For 24 hours:         Have an adult stay with you for 24 hours.         Relax and take it easy.         Drink plenty of fluids.         You may eat your normal diet, unless your doctor tells you otherwise.         Do NOT make any important or legal decisions.         Do NOT drive or operate machines at home or at work.         Do NOT drink alcohol.      Do NOT smoke.     Medicines:         If you have begun Plavix (clopidogrel), Effient (prasugrel), or Brilinta (ticagrelor), do not stop taking it until you talk to your heart doctor (cardiologist).         If you are on metformin (Glucophage), do not restart it until you have blood tests (within 2 to 3 days after discharge). When your doctor tells you it is safe, you may restart the metformin.         If you have stopped any other medicines, check with your nurse or provider about when to restart them.    Care of right groin site:         Remove the Band-Aid after 24 hours. If there is minor oozing, apply another Band-aid and remove it after 12 hours.          Do NOT take a bath, or use a hot tub or pool for at least 3 days. You may shower.          It is normal to have a small bruise or lump at the site.         Do not scrub the site.         Do not use lotion or powder near the puncture site for 3 days.         For the first 2 days: Do not stoop or squat. When you cough, sneeze or move your bowels, hold your hand over the puncture site and press gently.         Do not lift more than 10 pounds for at least 3 to 5 days.         For 2 days, do NOT have sex or do any heavy exercise.     If you start bleeding from the site in your groin:  Lie down flat and press firmly on the  "site.  Call your physician immediately, or, come to the emergency room.      Call 911 right away if you have bleeding that is heavy or does not stop.     Call your doctor if:         You have a large or growing hard lump around the site.         The site is red, swollen, hot or tender.         Blood or fluid is draining from the site.         You have chills or a fever greater than 101 F (38 C).         Your leg or arm turns bluish, feels numb or cool.         You have hives, a rash or unusual itching.         ADDITIONAL INSTRUCTIONS:     [facility] at [place]:   [phone] (7 days a week)      Cardiology Fellow on call (24 hours per day) at [facility], [place]:   [phone] (ask for Cardiology Fellow on call)      Number where we can reach you:  ____________    Pending Results     Date and Time Order Name Status Description    9/1/2018 1008 EKG 12-lead, tracing only Preliminary     8/31/2018 1215 CRP cardiac risk In process     8/31/2018 1011 EKG 12-lead, tracing only Preliminary     8/30/2018 2122 EKG 12-lead, tracing only Preliminary             Statement of Approval     Ordered          09/01/18 0849  I have reviewed and agree with all the recommendations and orders detailed in this document.  EFFECTIVE NOW     Comments:  Note patient would like to see cardiologist prior to discharge.   Approved and electronically signed by:  Yovany Roth MD             Admission Information     Date & Time Provider Department Dept. Phone    8/30/2018 Giorgio Bowen MD [facility] [phone]      Your Vitals Were     Blood Pressure Temperature Respirations Height Weight Pulse Oximetry    96/79 (BP Location: Left arm) 97.6  F (36.4  C) (Oral) 16 1.575 m (5' 2\") 76.6 kg (168 lb 14.4 oz) 96%    BMI (Body Mass Index)                   30.89 kg/m2           Care EveryWhere ID     This is your Care EveryWhere ID. This could be used by other organizations to " access your [facility] medical records  [id number]        Equal Access to Services     ASAEL NUNN : Hadii aad ku hadjosejoy Soflo, wasedada placidoadaha, qajulisata gunnarjesusesvin ventura, yolette salasvidhidionna uriostegui. So [facility] [phone].    ATENCIÓN: Si habla español, tiene a patel disposición servicios gratuitos de asistencia lingüística. Llame al [phone].    We comply with applicable federal civil rights laws and [place] laws. We do not discriminate on the basis of race, color, national origin, age, disability, sex, sexual orientation, or gender identity.               Review of your medicines      START taking        Dose / Directions    isosorbide mononitrate 30 MG 24 hr tablet   Commonly known as:  IMDUR   Used for:  History of coronary artery bypass graft        Dose:  30 mg   Take 1 tablet (30 mg) by mouth daily   Quantity:  30 tablet   Refills:  1         CONTINUE these medicines which may have CHANGED, or have new prescriptions. If we are uncertain of the size of tablets/capsules you have at home, strength may be listed as something that might have changed.        Dose / Directions    potassium chloride 10 MEQ tablet   Commonly known as:  K-TAB,KLOR-CON   This may have changed:  when to take this        Dose:  10 mEq   Take 1 tablet (10 mEq) by mouth daily in the PM   Quantity:  90 tablet   Refills:  1         CONTINUE these medicines which have NOT CHANGED        Dose / Directions    acetaminophen 500 MG tablet   Commonly known as:  TYLENOL        Dose:  500 mg   Take 500 mg by mouth every morning   Refills:  0       aspirin 81 MG tablet        Dose:  81 mg   Take 81 mg by mouth daily   Refills:  0       clopidogrel 75 MG tablet   Commonly known as:  PLAVIX        Dose:  75 mg   Take 75 mg by mouth every evening   Refills:  0       furosemide 20 MG tablet   Commonly known as:  LASIX        Dose:  40 mg   Take 40 mg by mouth daily   Refills:  0       metoprolol succinate 50 MG 24 hr tablet    Commonly known as:  TOPROL-XL        Dose:  50 mg   Take 50 mg by mouth daily   Refills:  0       rosuvastatin 20 MG tablet   Commonly known as:  CRESTOR        Dose:  20 mg   Take 20 mg by mouth every evening   Refills:  0       traMADol 50 MG tablet   Commonly known as:  ULTRAM        Dose:  50 mg   Take 50 mg by mouth At Bedtime   Refills:  0       triamterene-hydrochlorothiazide 37.5-25 MG per capsule   Commonly known as:  DYAZIDE   Used for:  CAD (coronary artery disease)        Dose:  1 capsule   Take 1 capsule by mouth daily   Quantity:  30 capsule   Refills:  0            Where to get your medicines      These medications were sent to [facility] [place] [facility] [facility] [place], [place] - [address]  [address], [place] [address]     Phone:  [phone]     isosorbide mononitrate 30 MG 24 hr tablet         Some of these will need a paper prescription and others can be bought over the counter. Ask your nurse if you have questions.     You don't need a prescription for these medications     potassium chloride 10 MEQ tablet                Protect others around you: Learn how to safely use, store and throw away your medicines at www.disposemymeds.org.        Information about OPIOIDS     PRESCRIPTION OPIOIDS: WHAT YOU NEED TO KNOW   We gave you an opioid (narcotic) pain medicine. It is important to manage your pain, but opioids are not always the best choice. You should first try all the other options your care team gave you. Take this medicine for as short a time (and as few doses) as possible.    Some activities can increase your pain, such as bandage changes or therapy sessions. It may help to take your pain medicine 30 to 60 minutes before these activities. Reduce your stress by getting enough sleep, working on hobbies you enjoy and practicing relaxation or meditation. Talk to your care team about ways to manage your pain beyond prescription opioids.    These medicines have risks:    DO NOT drive  when on new or higher doses of pain medicine. These medicines can affect your alertness and reaction times, and you could be arrested for driving under the influence (DUI). If you need to use opioids long-term, talk to your care team about driving.    DO NOT operate heavy machinery    DO NOT do any other dangerous activities while taking these medicines.    DO NOT drink any alcohol while taking these medicines.     If the opioid prescribed includes acetaminophen, DO NOT take with any other medicines that contain acetaminophen. Read all labels carefully. Look for the word  acetaminophen  or  Tylenol.  Ask your pharmacist if you have questions or are unsure.    You can get addicted to pain medicines, especially if you have a history of addiction (chemical, alcohol or substance dependence). Talk to your care team about ways to reduce this risk.    All opioids tend to cause constipation. Drink plenty of water and eat foods that have a lot of fiber, such as fruits, vegetables, prune juice, apple juice and high-fiber cereal. Take a laxative (Miralax, milk of magnesia, Colace, Senna) if you don t move your bowels at least every other day. Other side effects include upset stomach, sleepiness, dizziness, throwing up, tolerance (needing more of the medicine to have the same effect), physical dependence and slowed breathing.    Store your pills in a secure place, locked if possible. We will not replace any lost or stolen medicine. If you don t finish your medicine, please throw away (dispose) as directed by your pharmacist. The [place] Pollution Control Agency has more information about safe disposal: https://www.pca.[facility].[place].us/living-green/managing-unwanted-medications             Medication List: This is a list of all your medications and when to take them. Check marks below indicate your daily home schedule. Keep this list as a reference.      Medications           Morning Afternoon Evening Bedtime As Needed     acetaminophen 500 MG tablet   Commonly known as:  TYLENOL   Take 500 mg by mouth every morning   Last time this was given:  650 mg on 9/1/2018 10:12 AM   Next Dose Due:  9-2-18 AM                                   aspirin 81 MG tablet   Take 81 mg by mouth daily   Next Dose Due:  9-2-18 AM                                   clopidogrel 75 MG tablet   Commonly known as:  PLAVIX   Take 75 mg by mouth every evening   Last time this was given:  75 mg on 8/31/2018  9:13 PM   Next Dose Due:  9-1-18 PM                                   furosemide 20 MG tablet   Commonly known as:  LASIX   Take 40 mg by mouth daily   Next Dose Due:  9-2-18 AM                                   isosorbide mononitrate 30 MG 24 hr tablet   Commonly known as:  IMDUR   Take 1 tablet (30 mg) by mouth daily   Last time this was given:  30 mg on 9/1/2018  8:52 AM   Next Dose Due:  9-2-18 AM                                   metoprolol succinate 50 MG 24 hr tablet   Commonly known as:  TOPROL-XL   Take 50 mg by mouth daily   Last time this was given:  50 mg on 9/1/2018  8:53 AM   Next Dose Due:  9-2-18 AM                                   potassium chloride 10 MEQ tablet   Commonly known as:  K-TAB,KLOR-CON   Take 1 tablet (10 mEq) by mouth daily in the PM   Next Dose Due:  9-1-18 PM                                   rosuvastatin 20 MG tablet   Commonly known as:  CRESTOR   Take 20 mg by mouth every evening   Last time this was given:  20 mg on 8/31/2018  9:13 PM   Next Dose Due:  9-1-18 PM                                   traMADol 50 MG tablet   Commonly known as:  ULTRAM   Take 50 mg by mouth At Bedtime   Last time this was given:  50 mg on 8/30/2018 10:38 PM   Next Dose Due:  9-1-18 PM                                   triamterene-hydrochlorothiazide 37.5-25 MG per capsule   Commonly known as:  DYAZIDE   Take 1 capsule by mouth daily   Next Dose Due:  9-2-18 AM

## 2018-08-30 NOTE — PROGRESS NOTES
"Patient presented to clinic today per instruction of imaging staff due to active chest pain. RN out front to assess patient. Patient states that she was here for a thyroid scan today and was instructed to come up to the heart clinic for evaluation of chest pain. Patient states that she has been having chest pain for a couple days that is now radiating to her back. Patient denies any shortness of breath, but states \"she does not feel right.\" Patient states that her BP was elevated earlier today, states that her BP was 160s/80s, but a second reading later showed her BP back down to the 120s/80s. Reviewed with patient that it is advised that she seek care in the emergency room. RN accompanied patient to the ER via wheelchair for evaluation. En route, patient states that she had 3 stents placed in February of 2018 in [place] for similar symptoms. Patient last seen in the clinic in 2015 by Dr. Mcconnell. Patient has history of multiple stents and bypass. Patient states she is compliant with her plavix. RN provided handoff report to emergency room RN. Patient stable in transport.   "

## 2018-08-31 ENCOUNTER — APPOINTMENT (OUTPATIENT)
Dept: CARDIOLOGY | Facility: CLINIC | Age: 63
DRG: 287 | End: 2018-08-31
Attending: INTERNAL MEDICINE
Payer: COMMERCIAL

## 2018-08-31 LAB
ANION GAP SERPL CALCULATED.3IONS-SCNC: 8 MMOL/L (ref 3–14)
APTT PPP: 74 SEC (ref 22–37)
BUN SERPL-MCNC: 16 MG/DL (ref 7–30)
CALCIUM SERPL-MCNC: 9 MG/DL (ref 8.5–10.1)
CHLORIDE SERPL-SCNC: 107 MMOL/L (ref 94–109)
CO2 SERPL-SCNC: 27 MMOL/L (ref 20–32)
CREAT SERPL-MCNC: 0.82 MG/DL (ref 0.52–1.04)
D DIMER PPP FEU-MCNC: 0.3 UG/ML FEU (ref 0–0.5)
ERYTHROCYTE [SEDIMENTATION RATE] IN BLOOD BY WESTERGREN METHOD: 8 MM/H (ref 0–30)
GFR SERPL CREATININE-BSD FRML MDRD: 71 ML/MIN/1.7M2
GLUCOSE SERPL-MCNC: 91 MG/DL (ref 70–99)
LMWH PPP CHRO-ACNC: 0.17 IU/ML
LMWH PPP CHRO-ACNC: 0.28 IU/ML
POTASSIUM SERPL-SCNC: 3.4 MMOL/L (ref 3.4–5.3)
SODIUM SERPL-SCNC: 142 MMOL/L (ref 133–144)
TROPONIN I SERPL-MCNC: <0.015 UG/L (ref 0–0.04)
TROPONIN I SERPL-MCNC: <0.015 UG/L (ref 0–0.04)

## 2018-08-31 PROCEDURE — 27211089 ZZH KIT ACIST INJECTOR CR3

## 2018-08-31 PROCEDURE — 25000128 H RX IP 250 OP 636: Performed by: INTERNAL MEDICINE

## 2018-08-31 PROCEDURE — B2151ZZ FLUOROSCOPY OF LEFT HEART USING LOW OSMOLAR CONTRAST: ICD-10-PCS | Performed by: INTERNAL MEDICINE

## 2018-08-31 PROCEDURE — 99222 1ST HOSP IP/OBS MODERATE 55: CPT | Mod: 25 | Performed by: INTERNAL MEDICINE

## 2018-08-31 PROCEDURE — B2111ZZ FLUOROSCOPY OF MULTIPLE CORONARY ARTERIES USING LOW OSMOLAR CONTRAST: ICD-10-PCS | Performed by: INTERNAL MEDICINE

## 2018-08-31 PROCEDURE — 93016 CV STRESS TEST SUPVJ ONLY: CPT | Performed by: INTERNAL MEDICINE

## 2018-08-31 PROCEDURE — 99232 SBSQ HOSP IP/OBS MODERATE 35: CPT | Performed by: INTERNAL MEDICINE

## 2018-08-31 PROCEDURE — G0378 HOSPITAL OBSERVATION PER HR: HCPCS

## 2018-08-31 PROCEDURE — 93459 L HRT ART/GRFT ANGIO: CPT

## 2018-08-31 PROCEDURE — 27210787 ZZH MANIFOLD CR2

## 2018-08-31 PROCEDURE — 85520 HEPARIN ASSAY: CPT | Performed by: INTERNAL MEDICINE

## 2018-08-31 PROCEDURE — 25000125 ZZHC RX 250: Performed by: INTERNAL MEDICINE

## 2018-08-31 PROCEDURE — 4A023N7 MEASUREMENT OF CARDIAC SAMPLING AND PRESSURE, LEFT HEART, PERCUTANEOUS APPROACH: ICD-10-PCS | Performed by: INTERNAL MEDICINE

## 2018-08-31 PROCEDURE — 25000132 ZZH RX MED GY IP 250 OP 250 PS 637: Performed by: INTERNAL MEDICINE

## 2018-08-31 PROCEDURE — B2181ZZ FLUOROSCOPY OF LEFT INTERNAL MAMMARY BYPASS GRAFT USING LOW OSMOLAR CONTRAST: ICD-10-PCS | Performed by: INTERNAL MEDICINE

## 2018-08-31 PROCEDURE — 85652 RBC SED RATE AUTOMATED: CPT | Performed by: INTERNAL MEDICINE

## 2018-08-31 PROCEDURE — C1769 GUIDE WIRE: HCPCS

## 2018-08-31 PROCEDURE — 84484 ASSAY OF TROPONIN QUANT: CPT | Performed by: INTERNAL MEDICINE

## 2018-08-31 PROCEDURE — 93005 ELECTROCARDIOGRAM TRACING: CPT

## 2018-08-31 PROCEDURE — 99152 MOD SED SAME PHYS/QHP 5/>YRS: CPT

## 2018-08-31 PROCEDURE — 99153 MOD SED SAME PHYS/QHP EA: CPT

## 2018-08-31 PROCEDURE — 93321 DOPPLER ECHO F-UP/LMTD STD: CPT | Mod: 26 | Performed by: INTERNAL MEDICINE

## 2018-08-31 PROCEDURE — 86141 C-REACTIVE PROTEIN HS: CPT | Performed by: INTERNAL MEDICINE

## 2018-08-31 PROCEDURE — 99207 ZZC CDG-CODE CATEGORY CHANGED: CPT | Performed by: INTERNAL MEDICINE

## 2018-08-31 PROCEDURE — 93017 CV STRESS TEST TRACING ONLY: CPT

## 2018-08-31 PROCEDURE — 27210946 ZZH KIT HC TOTES DISP CR8

## 2018-08-31 PROCEDURE — 85730 THROMBOPLASTIN TIME PARTIAL: CPT | Performed by: INTERNAL MEDICINE

## 2018-08-31 PROCEDURE — 93010 ELECTROCARDIOGRAM REPORT: CPT | Performed by: INTERNAL MEDICINE

## 2018-08-31 PROCEDURE — 99152 MOD SED SAME PHYS/QHP 5/>YRS: CPT | Performed by: INTERNAL MEDICINE

## 2018-08-31 PROCEDURE — 27210795 ZZH PAD DEFIB QUICK CR4

## 2018-08-31 PROCEDURE — 80048 BASIC METABOLIC PNL TOTAL CA: CPT | Performed by: INTERNAL MEDICINE

## 2018-08-31 PROCEDURE — 85379 FIBRIN DEGRADATION QUANT: CPT | Performed by: INTERNAL MEDICINE

## 2018-08-31 PROCEDURE — 93018 CV STRESS TEST I&R ONLY: CPT | Performed by: INTERNAL MEDICINE

## 2018-08-31 PROCEDURE — 93325 DOPPLER ECHO COLOR FLOW MAPG: CPT | Mod: 26 | Performed by: INTERNAL MEDICINE

## 2018-08-31 PROCEDURE — 93458 L HRT ARTERY/VENTRICLE ANGIO: CPT | Mod: 26 | Performed by: INTERNAL MEDICINE

## 2018-08-31 PROCEDURE — 36415 COLL VENOUS BLD VENIPUNCTURE: CPT | Performed by: INTERNAL MEDICINE

## 2018-08-31 PROCEDURE — 25500064 ZZH RX 255 OP 636: Performed by: INTERNAL MEDICINE

## 2018-08-31 PROCEDURE — 93350 STRESS TTE ONLY: CPT | Mod: 26 | Performed by: INTERNAL MEDICINE

## 2018-08-31 PROCEDURE — 21000001 ZZH R&B HEART CARE

## 2018-08-31 RX ORDER — NALOXONE HYDROCHLORIDE 0.4 MG/ML
.1-.4 INJECTION, SOLUTION INTRAMUSCULAR; INTRAVENOUS; SUBCUTANEOUS
Status: DISCONTINUED | OUTPATIENT
Start: 2018-08-31 | End: 2018-09-01 | Stop reason: HOSPADM

## 2018-08-31 RX ORDER — LORAZEPAM 2 MG/ML
0.5 INJECTION INTRAMUSCULAR
Status: DISCONTINUED | OUTPATIENT
Start: 2018-08-31 | End: 2018-08-31

## 2018-08-31 RX ORDER — ACETAMINOPHEN 325 MG/1
325-650 TABLET ORAL EVERY 4 HOURS PRN
Status: DISCONTINUED | OUTPATIENT
Start: 2018-08-31 | End: 2018-09-01 | Stop reason: HOSPADM

## 2018-08-31 RX ORDER — NIFEDIPINE 10 MG/1
10 CAPSULE ORAL
Status: DISCONTINUED | OUTPATIENT
Start: 2018-08-31 | End: 2018-08-31

## 2018-08-31 RX ORDER — NITROGLYCERIN 0.4 MG/1
0.4 TABLET SUBLINGUAL EVERY 5 MIN PRN
Status: DISCONTINUED | OUTPATIENT
Start: 2018-08-31 | End: 2018-08-31

## 2018-08-31 RX ORDER — HEPARIN SODIUM 1000 [USP'U]/ML
1000-10000 INJECTION, SOLUTION INTRAVENOUS; SUBCUTANEOUS EVERY 5 MIN PRN
Status: DISCONTINUED | OUTPATIENT
Start: 2018-08-31 | End: 2018-08-31

## 2018-08-31 RX ORDER — METHYLPREDNISOLONE SODIUM SUCCINATE 125 MG/2ML
125 INJECTION, POWDER, LYOPHILIZED, FOR SOLUTION INTRAMUSCULAR; INTRAVENOUS
Status: DISCONTINUED | OUTPATIENT
Start: 2018-08-31 | End: 2018-08-31

## 2018-08-31 RX ORDER — LIDOCAINE HYDROCHLORIDE 10 MG/ML
1-10 INJECTION, SOLUTION EPIDURAL; INFILTRATION; INTRACAUDAL; PERINEURAL
Status: DISCONTINUED | OUTPATIENT
Start: 2018-08-31 | End: 2018-08-31

## 2018-08-31 RX ORDER — ENALAPRILAT 1.25 MG/ML
1.25-2.5 INJECTION INTRAVENOUS
Status: DISCONTINUED | OUTPATIENT
Start: 2018-08-31 | End: 2018-08-31

## 2018-08-31 RX ORDER — SODIUM CHLORIDE 9 MG/ML
INJECTION, SOLUTION INTRAVENOUS CONTINUOUS
Status: DISCONTINUED | OUTPATIENT
Start: 2018-08-31 | End: 2018-09-01 | Stop reason: HOSPADM

## 2018-08-31 RX ORDER — NALOXONE HYDROCHLORIDE 0.4 MG/ML
0.4 INJECTION, SOLUTION INTRAMUSCULAR; INTRAVENOUS; SUBCUTANEOUS EVERY 5 MIN PRN
Status: DISCONTINUED | OUTPATIENT
Start: 2018-08-31 | End: 2018-08-31

## 2018-08-31 RX ORDER — NICARDIPINE HYDROCHLORIDE 2.5 MG/ML
100 INJECTION INTRAVENOUS
Status: DISCONTINUED | OUTPATIENT
Start: 2018-08-31 | End: 2018-08-31

## 2018-08-31 RX ORDER — FLUMAZENIL 0.1 MG/ML
0.2 INJECTION, SOLUTION INTRAVENOUS
Status: DISCONTINUED | OUTPATIENT
Start: 2018-08-31 | End: 2018-08-31

## 2018-08-31 RX ORDER — ASPIRIN 81 MG/1
81 TABLET ORAL DAILY
Status: DISCONTINUED | OUTPATIENT
Start: 2018-09-01 | End: 2018-09-01 | Stop reason: HOSPADM

## 2018-08-31 RX ORDER — HYDRALAZINE HYDROCHLORIDE 20 MG/ML
10-20 INJECTION INTRAMUSCULAR; INTRAVENOUS
Status: DISCONTINUED | OUTPATIENT
Start: 2018-08-31 | End: 2018-08-31

## 2018-08-31 RX ORDER — DEXTROSE MONOHYDRATE 25 G/50ML
12.5-5 INJECTION, SOLUTION INTRAVENOUS EVERY 30 MIN PRN
Status: DISCONTINUED | OUTPATIENT
Start: 2018-08-31 | End: 2018-08-31

## 2018-08-31 RX ORDER — POTASSIUM CHLORIDE 1500 MG/1
20 TABLET, EXTENDED RELEASE ORAL
Status: COMPLETED | OUTPATIENT
Start: 2018-08-31 | End: 2018-08-31

## 2018-08-31 RX ORDER — POTASSIUM CHLORIDE 29.8 MG/ML
20 INJECTION INTRAVENOUS
Status: DISCONTINUED | OUTPATIENT
Start: 2018-08-31 | End: 2018-08-31

## 2018-08-31 RX ORDER — PHENYLEPHRINE HCL IN 0.9% NACL 1 MG/10 ML
20-100 SYRINGE (ML) INTRAVENOUS
Status: DISCONTINUED | OUTPATIENT
Start: 2018-08-31 | End: 2018-08-31

## 2018-08-31 RX ORDER — SODIUM NITROPRUSSIDE 25 MG/ML
100-200 INJECTION INTRAVENOUS
Status: DISCONTINUED | OUTPATIENT
Start: 2018-08-31 | End: 2018-08-31

## 2018-08-31 RX ORDER — DOBUTAMINE HYDROCHLORIDE 200 MG/100ML
2-20 INJECTION INTRAVENOUS CONTINUOUS PRN
Status: DISCONTINUED | OUTPATIENT
Start: 2018-08-31 | End: 2018-08-31

## 2018-08-31 RX ORDER — LIDOCAINE HYDROCHLORIDE 10 MG/ML
30 INJECTION, SOLUTION EPIDURAL; INFILTRATION; INTRACAUDAL; PERINEURAL
Status: DISCONTINUED | OUTPATIENT
Start: 2018-08-31 | End: 2018-08-31

## 2018-08-31 RX ORDER — METOPROLOL TARTRATE 1 MG/ML
5 INJECTION, SOLUTION INTRAVENOUS EVERY 5 MIN PRN
Status: DISCONTINUED | OUTPATIENT
Start: 2018-08-31 | End: 2018-08-31

## 2018-08-31 RX ORDER — ADENOSINE 3 MG/ML
12-12000 INJECTION, SOLUTION INTRAVENOUS
Status: DISCONTINUED | OUTPATIENT
Start: 2018-08-31 | End: 2018-08-31

## 2018-08-31 RX ORDER — ASPIRIN 81 MG/1
81-324 TABLET, CHEWABLE ORAL
Status: DISCONTINUED | OUTPATIENT
Start: 2018-08-31 | End: 2018-08-31

## 2018-08-31 RX ORDER — MORPHINE SULFATE 2 MG/ML
1-2 INJECTION, SOLUTION INTRAMUSCULAR; INTRAVENOUS EVERY 5 MIN PRN
Status: DISCONTINUED | OUTPATIENT
Start: 2018-08-31 | End: 2018-08-31

## 2018-08-31 RX ORDER — CEFTRIAXONE 1 G/1
1 INJECTION, POWDER, FOR SOLUTION INTRAMUSCULAR; INTRAVENOUS EVERY 24 HOURS
Status: DISCONTINUED | OUTPATIENT
Start: 2018-08-31 | End: 2018-09-01 | Stop reason: HOSPADM

## 2018-08-31 RX ORDER — CLOPIDOGREL 300 MG/1
300-600 TABLET, FILM COATED ORAL
Status: DISCONTINUED | OUTPATIENT
Start: 2018-08-31 | End: 2018-08-31

## 2018-08-31 RX ORDER — FUROSEMIDE 10 MG/ML
20-100 INJECTION INTRAMUSCULAR; INTRAVENOUS
Status: DISCONTINUED | OUTPATIENT
Start: 2018-08-31 | End: 2018-08-31

## 2018-08-31 RX ORDER — LORAZEPAM 2 MG/ML
.5-2 INJECTION INTRAMUSCULAR EVERY 4 HOURS PRN
Status: DISCONTINUED | OUTPATIENT
Start: 2018-08-31 | End: 2018-08-31

## 2018-08-31 RX ORDER — NITROGLYCERIN 5 MG/ML
100-200 VIAL (ML) INTRAVENOUS
Status: DISCONTINUED | OUTPATIENT
Start: 2018-08-31 | End: 2018-08-31

## 2018-08-31 RX ORDER — NITROGLYCERIN 20 MG/100ML
.07-2 INJECTION INTRAVENOUS CONTINUOUS PRN
Status: DISCONTINUED | OUTPATIENT
Start: 2018-08-31 | End: 2018-08-31

## 2018-08-31 RX ORDER — ONDANSETRON 2 MG/ML
4 INJECTION INTRAMUSCULAR; INTRAVENOUS EVERY 4 HOURS PRN
Status: DISCONTINUED | OUTPATIENT
Start: 2018-08-31 | End: 2018-08-31

## 2018-08-31 RX ORDER — PRASUGREL 10 MG/1
10-60 TABLET, FILM COATED ORAL
Status: DISCONTINUED | OUTPATIENT
Start: 2018-08-31 | End: 2018-08-31

## 2018-08-31 RX ORDER — DOPAMINE HYDROCHLORIDE 160 MG/100ML
2-20 INJECTION, SOLUTION INTRAVENOUS CONTINUOUS PRN
Status: DISCONTINUED | OUTPATIENT
Start: 2018-08-31 | End: 2018-08-31

## 2018-08-31 RX ORDER — BUPIVACAINE HYDROCHLORIDE 2.5 MG/ML
1-10 INJECTION, SOLUTION EPIDURAL; INFILTRATION; INTRACAUDAL
Status: DISCONTINUED | OUTPATIENT
Start: 2018-08-31 | End: 2018-08-31

## 2018-08-31 RX ORDER — VERAPAMIL HYDROCHLORIDE 2.5 MG/ML
1-2.5 INJECTION, SOLUTION INTRAVENOUS
Status: DISCONTINUED | OUTPATIENT
Start: 2018-08-31 | End: 2018-08-31

## 2018-08-31 RX ORDER — NITROGLYCERIN 5 MG/ML
100-500 VIAL (ML) INTRAVENOUS
Status: DISCONTINUED | OUTPATIENT
Start: 2018-08-31 | End: 2018-08-31

## 2018-08-31 RX ORDER — FENTANYL CITRATE 50 UG/ML
25-50 INJECTION, SOLUTION INTRAMUSCULAR; INTRAVENOUS
Status: DISCONTINUED | OUTPATIENT
Start: 2018-08-31 | End: 2018-08-31

## 2018-08-31 RX ORDER — PROTAMINE SULFATE 10 MG/ML
1-5 INJECTION, SOLUTION INTRAVENOUS
Status: DISCONTINUED | OUTPATIENT
Start: 2018-08-31 | End: 2018-08-31

## 2018-08-31 RX ORDER — ASPIRIN 325 MG
325 TABLET ORAL
Status: DISCONTINUED | OUTPATIENT
Start: 2018-08-31 | End: 2018-08-31

## 2018-08-31 RX ORDER — LIDOCAINE HYDROCHLORIDE 10 MG/ML
1-10 INJECTION, SOLUTION EPIDURAL; INFILTRATION; INTRACAUDAL; PERINEURAL
Status: COMPLETED | OUTPATIENT
Start: 2018-08-31 | End: 2018-08-31

## 2018-08-31 RX ORDER — EPINEPHRINE 1 MG/ML
0.3 INJECTION, SOLUTION, CONCENTRATE INTRAVENOUS
Status: DISCONTINUED | OUTPATIENT
Start: 2018-08-31 | End: 2018-08-31

## 2018-08-31 RX ORDER — FENTANYL CITRATE 50 UG/ML
25-50 INJECTION, SOLUTION INTRAMUSCULAR; INTRAVENOUS
Status: ACTIVE | OUTPATIENT
Start: 2018-08-31 | End: 2018-09-01

## 2018-08-31 RX ORDER — ATROPINE SULFATE 0.1 MG/ML
.5-1 INJECTION INTRAVENOUS
Status: DISCONTINUED | OUTPATIENT
Start: 2018-08-31 | End: 2018-08-31

## 2018-08-31 RX ORDER — ACETAMINOPHEN 500 MG
1000 TABLET ORAL AT BEDTIME
Status: DISCONTINUED | OUTPATIENT
Start: 2018-08-31 | End: 2018-09-01 | Stop reason: HOSPADM

## 2018-08-31 RX ORDER — IOPAMIDOL 755 MG/ML
60 INJECTION, SOLUTION INTRAVASCULAR ONCE
Status: COMPLETED | OUTPATIENT
Start: 2018-08-31 | End: 2018-08-31

## 2018-08-31 RX ORDER — HYDROCODONE BITARTRATE AND ACETAMINOPHEN 5; 325 MG/1; MG/1
1-2 TABLET ORAL EVERY 4 HOURS PRN
Status: DISCONTINUED | OUTPATIENT
Start: 2018-08-31 | End: 2018-09-01 | Stop reason: HOSPADM

## 2018-08-31 RX ORDER — DIPHENHYDRAMINE HYDROCHLORIDE 50 MG/ML
25-50 INJECTION INTRAMUSCULAR; INTRAVENOUS
Status: DISCONTINUED | OUTPATIENT
Start: 2018-08-31 | End: 2018-08-31

## 2018-08-31 RX ORDER — SODIUM CHLORIDE 9 MG/ML
INJECTION, SOLUTION INTRAVENOUS CONTINUOUS
Status: DISCONTINUED | OUTPATIENT
Start: 2018-08-31 | End: 2018-08-31

## 2018-08-31 RX ORDER — LIDOCAINE 40 MG/G
CREAM TOPICAL
Status: DISCONTINUED | OUTPATIENT
Start: 2018-08-31 | End: 2018-08-31

## 2018-08-31 RX ORDER — POTASSIUM CHLORIDE 7.45 MG/ML
10 INJECTION INTRAVENOUS
Status: DISCONTINUED | OUTPATIENT
Start: 2018-08-31 | End: 2018-08-31

## 2018-08-31 RX ORDER — NALOXONE HYDROCHLORIDE 0.4 MG/ML
.2-.4 INJECTION, SOLUTION INTRAMUSCULAR; INTRAVENOUS; SUBCUTANEOUS
Status: ACTIVE | OUTPATIENT
Start: 2018-08-31 | End: 2018-09-01

## 2018-08-31 RX ORDER — CLOPIDOGREL BISULFATE 75 MG/1
75 TABLET ORAL
Status: DISCONTINUED | OUTPATIENT
Start: 2018-08-31 | End: 2018-08-31

## 2018-08-31 RX ORDER — LORAZEPAM 0.5 MG/1
0.5 TABLET ORAL
Status: DISCONTINUED | OUTPATIENT
Start: 2018-08-31 | End: 2018-08-31

## 2018-08-31 RX ORDER — LIDOCAINE 40 MG/G
CREAM TOPICAL
Status: DISCONTINUED | OUTPATIENT
Start: 2018-08-31 | End: 2018-09-01 | Stop reason: HOSPADM

## 2018-08-31 RX ORDER — PROTAMINE SULFATE 10 MG/ML
25-100 INJECTION, SOLUTION INTRAVENOUS EVERY 5 MIN PRN
Status: DISCONTINUED | OUTPATIENT
Start: 2018-08-31 | End: 2018-08-31

## 2018-08-31 RX ORDER — FLUMAZENIL 0.1 MG/ML
0.2 INJECTION, SOLUTION INTRAVENOUS
Status: ACTIVE | OUTPATIENT
Start: 2018-08-31 | End: 2018-09-01

## 2018-08-31 RX ORDER — ATROPINE SULFATE 0.1 MG/ML
0.5 INJECTION INTRAVENOUS EVERY 5 MIN PRN
Status: ACTIVE | OUTPATIENT
Start: 2018-08-31 | End: 2018-09-01

## 2018-08-31 RX ORDER — CIPROFLOXACIN 2 MG/ML
400 INJECTION, SOLUTION INTRAVENOUS EVERY 12 HOURS
Status: DISCONTINUED | OUTPATIENT
Start: 2018-08-31 | End: 2018-08-31

## 2018-08-31 RX ADMIN — SODIUM CHLORIDE: 9 INJECTION, SOLUTION INTRAVENOUS at 18:45

## 2018-08-31 RX ADMIN — MIDAZOLAM 0.5 MG: 1 INJECTION INTRAMUSCULAR; INTRAVENOUS at 15:39

## 2018-08-31 RX ADMIN — ACETAMINOPHEN 1000 MG: 500 TABLET, FILM COATED ORAL at 21:13

## 2018-08-31 RX ADMIN — FENTANYL CITRATE 25 MCG: 50 INJECTION, SOLUTION INTRAMUSCULAR; INTRAVENOUS at 15:43

## 2018-08-31 RX ADMIN — FENTANYL CITRATE 25 MCG: 50 INJECTION, SOLUTION INTRAMUSCULAR; INTRAVENOUS at 16:02

## 2018-08-31 RX ADMIN — SODIUM CHLORIDE: 9 INJECTION, SOLUTION INTRAVENOUS at 14:35

## 2018-08-31 RX ADMIN — FENTANYL CITRATE 25 MCG: 50 INJECTION, SOLUTION INTRAMUSCULAR; INTRAVENOUS at 15:40

## 2018-08-31 RX ADMIN — METOPROLOL SUCCINATE 50 MG: 50 TABLET, EXTENDED RELEASE ORAL at 10:00

## 2018-08-31 RX ADMIN — ROSUVASTATIN CALCIUM 20 MG: 20 TABLET, FILM COATED ORAL at 21:13

## 2018-08-31 RX ADMIN — MIDAZOLAM 0.5 MG: 1 INJECTION INTRAMUSCULAR; INTRAVENOUS at 15:41

## 2018-08-31 RX ADMIN — CIPROFLOXACIN 400 MG: 2 INJECTION, SOLUTION INTRAVENOUS at 04:33

## 2018-08-31 RX ADMIN — CLOPIDOGREL 75 MG: 75 TABLET, FILM COATED ORAL at 21:13

## 2018-08-31 RX ADMIN — POTASSIUM CHLORIDE 20 MEQ: 1500 TABLET, EXTENDED RELEASE ORAL at 14:35

## 2018-08-31 RX ADMIN — FENTANYL CITRATE 25 MCG: 50 INJECTION, SOLUTION INTRAMUSCULAR; INTRAVENOUS at 15:47

## 2018-08-31 RX ADMIN — ACETAMINOPHEN 500 MG: 500 TABLET, FILM COATED ORAL at 08:41

## 2018-08-31 RX ADMIN — HUMAN ALBUMIN MICROSPHERES AND PERFLUTREN 9 ML: 10; .22 INJECTION, SOLUTION INTRAVENOUS at 08:58

## 2018-08-31 RX ADMIN — IOPAMIDOL 60 ML: 755 INJECTION, SOLUTION INTRAVASCULAR at 16:15

## 2018-08-31 RX ADMIN — MIDAZOLAM 0.5 MG: 1 INJECTION INTRAMUSCULAR; INTRAVENOUS at 16:01

## 2018-08-31 RX ADMIN — ASPIRIN 81 MG: 81 TABLET, COATED ORAL at 08:41

## 2018-08-31 RX ADMIN — MIDAZOLAM 0.5 MG: 1 INJECTION INTRAMUSCULAR; INTRAVENOUS at 15:48

## 2018-08-31 RX ADMIN — OMEPRAZOLE 20 MG: 20 CAPSULE, DELAYED RELEASE ORAL at 08:41

## 2018-08-31 RX ADMIN — ASPIRIN 325 MG: 325 TABLET, DELAYED RELEASE ORAL at 14:35

## 2018-08-31 RX ADMIN — CEFTRIAXONE SODIUM 1 G: 1 INJECTION, POWDER, FOR SOLUTION INTRAMUSCULAR; INTRAVENOUS at 05:30

## 2018-08-31 RX ADMIN — LIDOCAINE HYDROCHLORIDE 10 ML: 10 INJECTION, SOLUTION EPIDURAL; INFILTRATION; INTRACAUDAL; PERINEURAL at 15:47

## 2018-08-31 ASSESSMENT — ACTIVITIES OF DAILY LIVING (ADL)
ADLS_ACUITY_SCORE: 9

## 2018-08-31 ASSESSMENT — PAIN DESCRIPTION - DESCRIPTORS: DESCRIPTORS: ACHING

## 2018-08-31 ASSESSMENT — ENCOUNTER SYMPTOMS: VOMITING: 0

## 2018-08-31 NOTE — PROVIDER NOTIFICATION
NM called and said unable to do ayo stress today because had NM parathyroid test yesterday.  Text paged Dr. Adler to inform.

## 2018-08-31 NOTE — PROVIDER NOTIFICATION
Pt complained of itching and pain at IV site shortly after IV cipro was started.  Stopped IV, checked VS.  Notified Dr. Ennis, he will switch to cephtiaxone

## 2018-08-31 NOTE — CONSULTS
Cardiology Consultation      Radhika Reynolds MRN# [id number]   YOB: 1955 Age: 62 year old   Date of Admission: 8/30/2018     Reason for consult: Chest discomfort           Assessment and Plan:     1. Chest discomfort similar to anginal episodes in the past  2. CAD s/p CABG x 1 in 2013 and recent MAXIMO to LM/Cx/RCA  3. Post-pericardiotomy syndome  4. HTN  5. Dyslipidemia    PLAN  -Given the aggressive nature of her coronary disease as well as the similarity of her symptoms to her previous anginal episodes, I have recommended coronary angiography for definitive evaluation and revascularization if clinically indicated.  I have explained the indications risks and benefits of coronary angiography to the patient in detail who wishes to discuss this with her  further before making a decision.  I think that is very reasonable.  She should however be n.p.o. for now.    - I would recommend checking a CRP and ESR to rule out recurrent pericarditis             Chief Complaint:   Chest Pain (Past few days. Radiates to her back.  3 stents placed in [place] 6 months ago.  )           History of Present Illness:   This patient is a 62 year old female who I have previously followed in our cardiology clinic, most recently in 2015. She has a history of coronary artery disease and is status post drug-eluting stent placement to the LAD and obtuse marginal.  In 02/2013, she underwent minimally invasive CABG x1 with LIMA to the LAD.  The procedure was complicated postoperatively by significant chest discomfort, consistent with a post-pericardiotomy syndrome.  She was treated with colchicine, steroids and aspirin and fortunately her symptoms resolved over the subsequent 2 years.    She moved to [place] in 2015 but still visits here during the summer.  In February 2018 she underwent repeat coronary angiography of the chest discomfort and  was found to have significant progression of her coronary artery disease.  After a  "long discussion, she ultimately underwent drug-eluting stent placement to the left main, circumflex and right coronary artery.  No symptoms of chest discomfort resolved soon afterwards and she resumed exercising.    Unfortunately over the past 2-3 days she has been experiencing left-sided chest discomfort at rest that has woken her up from sleep on at least 2 occasions.  This was similar to what she experienced prior to her stenting and bypass surgery in the past.  Yesterday she presented to our clinic asking for an EKG and a clinic appointment but given her symptoms she was referred to the ER for further evaluation.  Of note, she has recently been found to have hyperparathyroidism and surgical therapy in this regard is being contemplated.    To workup her complaints she underwent an exercise stress echocardiogram that was reported as negative.  She only achieved 76% of the maximum age-predicted heart rate, however, and did have some substernal chest tightness during the test.             Physical Exam:   Vitals were reviewed  Blood pressure 128/76, temperature 97.8  F (36.6  C), temperature source Oral, resp. rate 16, height 1.575 m (5' 2\"), weight 75.3 kg (166 lb 1.6 oz), SpO2 100 %.  Temperatures:  Current - Temp: 97.8  F (36.6  C); Max - Temp  Av.7  F (36.5  C)  Min: 97.5  F (36.4  C)  Max: 98.1  F (36.7  C)  Respiration range: Resp  Avg: 15.5  Min: 9  Max: 20  Pulse range: No Data Recorded  Blood pressure range: Systolic (24hrs), Av , Min:98 , Max:161   ; Diastolic (24hrs), Av, Min:60, Max:109    Pulse oximetry range: SpO2  Av %  Min: 92 %  Max: 100 %    Intake/Output Summary (Last 24 hours) at 18 1209  Last data filed at 18 1200   Gross per 24 hour   Intake           406.62 ml   Output              870 ml   Net          -463.38 ml     Constitutional:   awake, alert, cooperative, no apparent distress, and appears stated age     Eyes:   Lids and lashes normal, pupils equal, round " and reactive to light, extra ocular muscles intact, sclera clear, conjunctiva normal     Neck:   supple, symmetrical, trachea midline, no JVD     Back:   symmetric     Lungs:   No increased work of breathing, good air exchange, clear to auscultation bilaterally, no crackles or wheezing  clear to auscultation     Cardiovascular:   Normal apical impulse, regular rate and rhythm, normal S1 and S2, no S3 or S4, and no murmur noted.        Abdomen:   non-tender     Musculoskeletal:   motor strength is 5 out of 5 all extremities bilaterally     Neurologic:   Grossly nonfocal     Skin:   no bruising or bleeding     Additional findings:     No edema               Past Medical History:   I have reviewed this patient's past medical history  Past Medical History:   Diagnosis Date     Chest pain      Coronary artery disease     CABG 3/15/2013: CARTY to LAD, cath 2/12/2012: MAXIMO to LAD and MAXIMO to OM     Dyslipidemia      Gastro-oesophageal reflux disease      High cholesterol      History of angina      Hypertension      Hypokalemia      Pericarditis, acute March 2013    unspecified             Past Surgical History:   I have reviewed this patient's past surgical history  Past Surgical History:   Procedure Laterality Date     BIOPSY       CARDIAC SURGERY       CARDIAC SURGERY       CORONARY ARTERY BYPASS  3/15/2013     DAVINCI BYPASS ARTERY CORONARY  3/15/2013    Procedure: DAVINCI BYPASS ARTERY CORONARY;  Davinci Assisted  Coronary Artery Bypass Graft X 1 using Left Internal Mammary Artery off pump;  Surgeon: Alejandro Mcclendon MD;  Location: [facility] OR     GYN SURGERY       HEART CATH, ANGIOPLASTY  2/2012    MAXIMO to the LAD and obtuse marginal     LIPOSUCTION (LOCATION)                 Social History:   I have reviewed this patient's social history  Social History   Substance Use Topics     Smoking status: Former Smoker     Packs/day: 0.25     Years: 30.00     Types: Cigarettes     Quit date: 3/23/2013     Smokeless tobacco: Not  on file      Comment: 1-2 cigarettes per week     Alcohol use No             Family History:   I have reviewed this patient's family history  Family History   Problem Relation Age of Onset     Hypertension Mother      Other - See Comments Mother      back problems     Myocardial Infarction Father              Allergies:     Allergies   Allergen Reactions     Amoxicillin      Lisinopril Cough     Norvasc [Amlodipine]      Penicillin G              Medications:   I have reviewed this patient's current medications  Prescriptions Prior to Admission   Medication Sig Dispense Refill Last Dose     acetaminophen (TYLENOL) 500 MG tablet Take 500 mg by mouth every morning   8/30/2018 at AM     aspirin 81 MG tablet Take 81 mg by mouth daily   8/30/2018 at AM     clopidogrel (PLAVIX) 75 MG tablet Take 75 mg by mouth every evening   8/29/2018 at PM     furosemide (LASIX) 20 MG tablet Take 40 mg by mouth daily    8/30/2018 at AM     metoprolol succinate (TOPROL-XL) 50 MG 24 hr tablet Take 50 mg by mouth daily   8/30/2018 at AM     potassium chloride (K-TAB,KLOR-CON) 10 MEQ tablet Take 10 mEq by mouth every other day in the PM   8/28/2018 at PM     rosuvastatin (CRESTOR) 20 MG tablet Take 20 mg by mouth every evening    8/29/2018 at PM     traMADol (ULTRAM) 50 MG tablet Take 50 mg by mouth At Bedtime        triamterene-hydrochlorothiazide (DYAZIDE) 37.5-25 MG per capsule Take 1 capsule by mouth daily 30 capsule 0 8/29/2018 at PM     Current Facility-Administered Medications Ordered in Epic   Medication Dose Route Frequency Last Rate Last Dose     acetaminophen (TYLENOL) Suppository 650 mg  650 mg Rectal Q4H PRN         acetaminophen (TYLENOL) tablet 500 mg  500 mg Oral QAM   500 mg at 08/31/18 0841     acetaminophen (TYLENOL) tablet 650 mg  650 mg Oral Q4H PRN         aspirin EC tablet 81 mg  81 mg Oral Daily   81 mg at 08/31/18 0841     cefTRIAXone (ROCEPHIN) 1 g vial to attach to  mL bag for ADULTS or NS 50 mL bag for PEDS   1 g Intravenous Q24H   1 g at 08/31/18 0530     clopidogrel (PLAVIX) tablet 75 mg  75 mg Oral QPM   75 mg at 08/30/18 2238     Continuing aspirin from home medication list OR daily aspirin order already placed during this visit   Does not apply DOES NOT GO TO MAR         Continuing beta blocker from home medication list OR beta blocker order already placed during this visit   Does not apply DOES NOT GO TO MAR         Continuing statin from home medication list OR statin order already placed during this visit   Does not apply DOES NOT GO TO MAR         heparin infusion 25,000 units in 0.45% NaCl 250 mL  700 Units/hr Intravenous Continuous 7 mL/hr at 08/30/18 2112 700 Units/hr at 08/30/18 2112     HOLD: Beta Blockers The evening before and the morning of procedure   Does not apply HOLD         HOLD: Caffeine containing medications 12 hours prior to the procedure   Does not apply HOLD         HOLD: dipyridamole (PERSANTINE) or aspirin/dipyridamole (AGGRENOX) 48 hours prior to the procedure   Does not apply HOLD         HOLD: nitroGLYcerin IF   Does not apply HOLD         HOLD: Phosphodiesterase-5 (PDE-5) inhibitor medications -vardenafil (LEVITRA/STAXYN), sildenafil (VIAGRA/REVATIO), tadalafil (CIALIS/ADCIRCA), avanafil (STENDRA) - 48 hours prior to the procedure   Does not apply HOLD         HOLD: theophylline or aminophylline 12 hours prior to the procedure   Does not apply HOLD         IF patient diabetic - HOLD: ALL ORAL HYPOGLYCEMICS and include: glipizide, glyburide, glimepiride, gliclazide, metformin, any metformin containing medication, on day of the procedure   Does not apply HOLD         lidocaine (LMX4) cream   Topical Q1H PRN         lidocaine 1 % 1 mL  1 mL Other Q1H PRN         magnesium sulfate 4 g in 100 mL sterile water (premade)  4 g Intravenous Q4H PRN         medication instruction   Does not apply Continuous PRN         metoprolol succinate (TOPROL-XL) 24 hr tablet 50 mg  50 mg Oral Daily   50 mg  at 08/31/18 1000     naloxone (NARCAN) injection 0.1-0.4 mg  0.1-0.4 mg Intravenous Q2 Min PRN         nitroGLYcerin (NITROSTAT) sublingual tablet 0.4 mg  0.4 mg Sublingual Q5 Min PRN         omeprazole (priLOSEC) CR capsule 20 mg  20 mg Oral QAM AC   20 mg at 08/31/18 0841     ondansetron (ZOFRAN-ODT) ODT tab 4 mg  4 mg Oral Q6H PRN        Or     ondansetron (ZOFRAN) injection 4 mg  4 mg Intravenous Q6H PRN         Patient is already receiving anticoagulation with heparin, enoxaparin (LOVENOX), warfarin (COUMADIN)  or other anticoagulant medication   Does not apply Continuous PRN         potassium chloride (KLOR-CON) Packet 20-40 mEq  20-40 mEq Oral or Feeding Tube Q2H PRN         potassium chloride 10 mEq in 100 mL intermittent infusion with 10 mg lidocaine  10 mEq Intravenous Q1H PRN         potassium chloride 10 mEq in 100 mL sterile water intermittent infusion (premix)  10 mEq Intravenous Q1H PRN         potassium chloride 20 mEq in 50 mL intermittent infusion  20 mEq Intravenous Q1H PRN         potassium chloride SA (K-DUR/KLOR-CON M) CR tablet 20-40 mEq  20-40 mEq Oral Q2H PRN         Reason ACE/ARB/ARNI order not selected   Other DOES NOT GO TO MAR         rosuvastatin (CRESTOR) tablet 20 mg  20 mg Oral QPM   20 mg at 08/30/18 2238     sodium chloride (PF) 0.9% PF flush 1-10 mL  1-10 mL Intravenous Q10 Min PRN         sodium chloride (PF) 0.9% PF flush 3 mL  3 mL Intracatheter Q1H PRN         sodium chloride (PF) 0.9% PF flush 3 mL  3 mL Intracatheter Q8H         traMADol (ULTRAM) tablet 50 mg  50 mg Oral At Bedtime   50 mg at 08/30/18 2238     No current Epic-ordered outpatient prescriptions on file.             Review of Systems:   The 10 point Review of Systems is negative other than noted in the HPI            Data:   All laboratory data reviewed  Results for orders placed or performed during the hospital encounter of 08/30/18 (from the past 24 hour(s))   EKG 12 lead   Result Value Ref Range     Interpretation ECG Click View Image link to view waveform and result    Nt probnp inpatient (BNP)   Result Value Ref Range    N-Terminal Pro BNP Inpatient 34 0 - 900 pg/mL   Troponin I   Result Value Ref Range    Troponin I ES <0.015 0.000 - 0.045 ug/L   Basic metabolic panel   Result Value Ref Range    Sodium 140 133 - 144 mmol/L    Potassium 3.0 (L) 3.4 - 5.3 mmol/L    Chloride 102 94 - 109 mmol/L    Carbon Dioxide 31 20 - 32 mmol/L    Anion Gap 7 3 - 14 mmol/L    Glucose 107 (H) 70 - 99 mg/dL    Urea Nitrogen 14 7 - 30 mg/dL    Creatinine 0.87 0.52 - 1.04 mg/dL    GFR Estimate 66 >60 mL/min/1.7m2    GFR Estimate If Black 80 >60 mL/min/1.7m2    Calcium 9.6 8.5 - 10.1 mg/dL   D dimer quantitative   Result Value Ref Range    D Dimer 0.3 0.0 - 0.50 ug/ml FEU   CBC with platelets differential   Result Value Ref Range    WBC 6.5 4.0 - 11.0 10e9/L    RBC Count 4.58 3.8 - 5.2 10e12/L    Hemoglobin 13.8 11.7 - 15.7 g/dL    Hematocrit 40.0 35.0 - 47.0 %    MCV 87 78 - 100 fl    MCH 30.1 26.5 - 33.0 pg    MCHC 34.5 31.5 - 36.5 g/dL    RDW 13.1 10.0 - 15.0 %    Platelet Count 238 150 - 450 10e9/L    Diff Method Automated Method     % Neutrophils 60.1 %    % Lymphocytes 32.4 %    % Monocytes 5.3 %    % Eosinophils 1.4 %    % Basophils 0.6 %    % Immature Granulocytes 0.2 %    Nucleated RBCs 0 0 /100    Absolute Neutrophil 3.9 1.6 - 8.3 10e9/L    Absolute Lymphocytes 2.1 0.8 - 5.3 10e9/L    Absolute Monocytes 0.3 0.0 - 1.3 10e9/L    Absolute Eosinophils 0.1 0.0 - 0.7 10e9/L    Absolute Basophils 0.0 0.0 - 0.2 10e9/L    Abs Immature Granulocytes 0.0 0 - 0.4 10e9/L    Absolute Nucleated RBC 0.0    Magnesium   Result Value Ref Range    Magnesium 2.1 1.6 - 2.3 mg/dL   EKG 12 lead   Result Value Ref Range    Interpretation ECG Click View Image link to view waveform and result    XR Chest 2 Views    Narrative    CHEST TWO VIEWS 8/30/2018 5:12 PM     HISTORY: Chest pain.      COMPARISON: August 16, 2015     FINDINGS: There are no  acute infiltrates. The cardiac silhouette is  not enlarged. Pulmonary vasculature is unremarkable.      Impression    IMPRESSION: No acute disease.    CLIFF PAGAN MD   Troponin I - Now then in 4 hours x 3   Result Value Ref Range    Troponin I ES <0.015 0.000 - 0.045 ug/L   INR   Result Value Ref Range    INR 1.07 0.86 - 1.14   Partial thromboplastin time   Result Value Ref Range    PTT >240 (HH) 22 - 37 sec   CBC with platelets   Result Value Ref Range    WBC 8.9 4.0 - 11.0 10e9/L    RBC Count 4.69 3.8 - 5.2 10e12/L    Hemoglobin 14.1 11.7 - 15.7 g/dL    Hematocrit 40.5 35.0 - 47.0 %    MCV 86 78 - 100 fl    MCH 30.1 26.5 - 33.0 pg    MCHC 34.8 31.5 - 36.5 g/dL    RDW 13.1 10.0 - 15.0 %    Platelet Count 267 150 - 450 10e9/L   Magnesium   Result Value Ref Range    Magnesium 2.3 1.6 - 2.3 mg/dL   TSH with free T4 reflex   Result Value Ref Range    TSH 1.61 0.40 - 4.00 mU/L   EKG 12-lead, tracing only   Result Value Ref Range    Interpretation ECG Click View Image link to view waveform and result    UA with Microscopic reflex to Culture   Result Value Ref Range    Color Urine Yellow     Appearance Urine Clear     Glucose Urine Negative NEG^Negative mg/dL    Bilirubin Urine Negative NEG^Negative    Ketones Urine Negative NEG^Negative mg/dL    Specific Gravity Urine 1.019 1.003 - 1.035    Blood Urine Trace (A) NEG^Negative    pH Urine 6.5 5.0 - 7.0 pH    Protein Albumin Urine 10 (A) NEG^Negative mg/dL    Urobilinogen mg/dL Normal 0.0 - 2.0 mg/dL    Nitrite Urine Negative NEG^Negative    Leukocyte Esterase Urine Large (A) NEG^Negative    Source Midstream Urine     WBC Urine 11 (H) 0 - 5 /HPF    RBC Urine 2 0 - 2 /HPF    Bacteria Urine Few (A) NEG^Negative /HPF    Squamous Epithelial /HPF Urine 3 (H) 0 - 1 /HPF    Mucous Urine Present (A) NEG^Negative /LPF   Urine Culture Aerobic Bacterial   Result Value Ref Range    Specimen Description Midstream Urine     Special Requests Specimen received in preservative     Culture  Micro PENDING    Troponin I - Now then in 4 hours x 3   Result Value Ref Range    Troponin I ES <0.015 0.000 - 0.045 ug/L   PTT (AM Draw)   Result Value Ref Range    PTT 74 (H) 22 - 37 sec   Heparin Xa level (AM Draw)   Result Value Ref Range    Heparin 10A Level 0.28 IU/mL   Troponin I - Now then in 4 hours x 3   Result Value Ref Range    Troponin I ES <0.015 0.000 - 0.045 ug/L   Basic metabolic panel   Result Value Ref Range    Sodium 142 133 - 144 mmol/L    Potassium 3.4 3.4 - 5.3 mmol/L    Chloride 107 94 - 109 mmol/L    Carbon Dioxide 27 20 - 32 mmol/L    Anion Gap 8 3 - 14 mmol/L    Glucose 91 70 - 99 mg/dL    Urea Nitrogen 16 7 - 30 mg/dL    Creatinine 0.82 0.52 - 1.04 mg/dL    GFR Estimate 71 >60 mL/min/1.7m2    GFR Estimate If Black 86 >60 mL/min/1.7m2    Calcium 9.0 8.5 - 10.1 mg/dL   ECHO STRESS WITH OPTISON    Narrative    [id number]  Critical access hospital  [id number]  [id number]^CEDRICK^NIDHI^CHRISTOPHER           [facility]  Echocardiography Laboratory  [address] [place] [address]        Name: MIS RODRIGUEZ  MRN: [id number]  : 1955  Study Date: 2018 08:41 AM  Age: 62 yrs  Gender: Female  Patient Location: [facility]  Reason For Study: Chest Pain  Ordering Physician: NIDHI PHILIP  Referring Physician: Carlos Edwards  Performed By: Rey Trujillo RDCS     BSA: 1.8 m2  Height: 62 in  Weight: 166 lb  HR: 67  BP: 124/78 mmHg  _____________________________________________________________________________  __        Procedure  Stress Echo Complete. Contrast Optison.  _____________________________________________________________________________  __        Interpretation Summary  1. Average exercise capacity, 76% max HR achieved.  2. At peak exercise, 2/10 left chest, shoulder, and back discomfort.  3. This was a normal stress EKG with no evidence of stress-induced ischemia.  4. Rest echo: Normal left ventricular function and wall motion at rest. The  visual ejection fraction is estimated at  55-60%.  5. Stress echo: This was a normal stress echocardiogram with no evidence of  stress-induced ischemia. The visual ejection fraction is estimated at 65-70%.     Stress echo was normal in 2012.  _____________________________________________________________________________  __     Stress  There was a normal BP response to exercise.  Exercise was stopped due to fatigue.  At peak exercise, 2/10 left chest, shoulder, and back discomfort.  Target Heart Rate was not achieved due to fatigue.  This was a normal stress EKG with no evidence of stress-induced ischemia.  This was a normal stress echocardiogram with no evidence of stress-induced  ischemia.  The visual ejection fraction is estimated at 65-70%.  Normal resting wall motion and no stress-induced wall motion abnormality.     Baseline  Resting ECG is normal.  The patient is in normal sinus rhythm.  Normal left ventricular function and wall motion at rest.  The visual ejection fraction is estimated at 55-60%.     Stress Results         Protocol:  Spencer Protocol        Maximum Predicted HR:   160 bpm         Target HR: 136 bpm               % Maximum Predicted HR: 76 %              Duration   Heart    Stage   (mm:ss)   Rate     BP                     Comment                      (bpm)   Stage 1   3:00      89    138/74Breathing harder   Stage 2   3:00      104   148/702/10 pain under Left breast   Stage 3   1:36      121     /   No increase in pain with increase in                                   exertion  RecoveryR  4:00      74    127/77RPP = 76276, White = 3, FAC = Above average                Stress Duration:   7:36 mm:ss        Recovery Time: 4:00 mm:ss          Maximum Stress HR: 121 bpm           METS:          10     Left Ventricle  The left ventricle is normal in structure, function and size.        Right Ventricle  The right ventricle is normal in structure, function and size.     Mitral Valve  There is trace to mild mitral regurgitation.     Tricuspid  Valve  No tricuspid regurgitation.     Aortic Valve  Normal tricuspid aortic valve.     _____________________________________________________________________________  __                             Report approved by: Laura Cool 08/31/2018 09:44 AM                    _____________________________________________________________________________  __      EKG 12-lead, tracing only   Result Value Ref Range    Interpretation ECG Click View Image link to view waveform and result      EKG results: NSR with no acute ST T wave changes

## 2018-08-31 NOTE — PLAN OF CARE
Problem: Cardiac: ACS (Acute Coronary Syndrome) (Adult)  Goal: Signs and Symptoms of Listed Potential Problems Will be Absent, Minimized or Managed (Cardiac: ACS)  Signs and symptoms of listed potential problems will be absent, minimized or managed by discharge/transition of care (reference Cardiac: ACS (Acute Coronary Syndrome) (Adult) CPG).   Outcome: No Change  VSS on RA.  Tele: SR first degree AVB.  Up with SBA. Hep at 700.  Pt initially had chest discomfort in L chest.  Did EKG and by completion discomfort resolved, has not returned.  Denies SOB.  NPO since midnight for lexiscan today.  Positive for UTI, IV cipro started, pt complained of itching and pain at site, appeared slightly reddened.  Stopped IV, notified MD.  VS checked and stable. Pt declined difficulty breathing.  Dr. Ennis will switch to cephtriaxone.  Pt stated they used ultrasound to place IV in ED.  Trending trops, so far neg.  Call light within reach.  Will continue to monitor.

## 2018-08-31 NOTE — PROGRESS NOTES
INTERNAL MEDICINE UPDATE    Called re: itching and redness around IV site after initiation of cipro infusion.    PLAN:  - Change to ceftriaxone.    Sánchez Ennis Jr., MD  [phone] (p)  Text Page

## 2018-08-31 NOTE — PROGRESS NOTES
RECEIVING UNIT ED HANDOFF REVIEW    ED Nurse Handoff Report was reviewed by: Wesly Quiles on August 30, 2018 at 8:29 PM

## 2018-08-31 NOTE — PROVIDER NOTIFICATION
Critical lab PTT>240.  Loli notified and pharmacy contacted.  PTT was drawn 45 mins after hep bolus, going to do add on PTT at 0100.  Suspected to be elevated from bolus.  Continue hep as order. No S/s of bleeding.  Will continue to monitor.

## 2018-08-31 NOTE — PROGRESS NOTES
Pt transferred from [facility] to [facility] for cath lab hold, VSS, denies pain, family present at bedside.

## 2018-08-31 NOTE — PROGRESS NOTES
[facility]  Hospitalist Progress Note  Doug Adler MD  08/31/2018    Assessment & Plan       Radhika Reynolds is a 62 year old female wit hx of complex CAD (s/p multiple stenting before and after IV CABG), HTN, intermitent hypokalemia, new dx of hypercalcemia/hyperparathyroidism, Thorasic spinal compression fx (on ultram at bedtime) ,GERD who presents with new onset chest pain.      Acute chest pain  Coronary artery disease    Assessment: complex CAD dz hx. Per pt hx of cardiac stenting 2011 ([facility]), 2012 ([facility]), 1V CABG ([facility]) and 3 stents in [place]. Had rest night cp similar to today's presentation with all episodes.   Now presents with intermitent rest pain at night and today with constant fluctuating pain all day.    - Troponin negative.   - smokes about 3 cigarettes per week.  - Some improvement with GI cocktail just given and pain eventually resolved.   - will check d-dimer and crp/esr (hx of pericarditis)  - stress echo, achieved sub-optimal work load, can't do ayo due to thyoid scan from yesterday  - discussed with cardiology  - plan or LHC  - continue BB, DAPT, statin     GERD:  -  told by pcp recently to restart ppi. Has night time gerd different from her cp.   - Some improvement with cp now in ED with gi cocktail.   - continue PPI     R/O hyperparathyroidism - no evidence thus far.  - had hypercalcemia when work up for fall  - undergoing hyperparathyroid work up  - I don't see PTH levels in the computer  - her parathyroid scan was normal and her calcium is normal    Hyperlipidemia    Assessment: on statin    Plan: cont statin, am lipiids, goal LDL <70     Essential hypertension, benign  - good control now and in recent PCP office visit.   - On PTA toprol xl 50mg every day, dyazide (and lasix - lasix may be for htn or leg jd- not clear. No clear hx of chf)  - cont toprol xl, dyazide, replace k      Hypokalemia    Assessment: hx of intermitent hypokalemia in past- even had  "hospitalization for this in past. On dyazide AND lasix. On kcl 10meq every other day but has been on every day in past.   - K and Mg protocol  - Hold lasix  - Increase outpatient dosing kcl to every day -consider increase dose to 20meq every day.  Close pcp follow up.       Possible UTI  +symptoms. UA slightly abnl, ++ subcutaneous epi   - Ucx, pending  - cipro change to ceftriaxone due to skin reaction     Compression fx  - occurred a few months ago and has been taking nightly ultram  - discontinue ultram, tylenol 1000 mg qhs    DVT Prophylaxis: Pneumatic Compression Devices     Code Status: Full Code     Disposition  - anticipate tomorrow    Interval History   - chart reviewed  - intermittent left sharp chest pain  -  at bedside  - spent 30 minutes discussing her parathyroid workup, her ultram use, her prior pericarditis.    -Data reviewed today: I reviewed all new labs and imaging over the last 24 hours. I personally reviewed no images or EKG's today.    Physical Exam   Heart Rate: 61, Blood pressure 128/76, temperature 97.8  F (36.6  C), temperature source Oral, resp. rate 16, height 1.575 m (5' 2\"), weight 75.3 kg (166 lb 1.6 oz), SpO2 100 %.  Vitals:    08/30/18 1445 08/30/18 2108 08/31/18 0200   Weight: 73.9 kg (163 lb) 75.3 kg (166 lb) 75.3 kg (166 lb 1.6 oz)     Vital Signs with Ranges  Temp:  [97.5  F (36.4  C)-98.1  F (36.7  C)] 97.8  F (36.6  C)  Heart Rate:  [50-77] 61  Resp:  [9-20] 16  BP: ()/() 128/76  SpO2:  [92 %-100 %] 100 %  I/O's Last 24 hours  I/O last 3 completed shifts:  In: 166.62 [I.V.:166.62]  Out: 470 [Urine:470]    Constitutional: Awake, alert, cooperative, no apparent distress  Respiratory: Clear to auscultation bilaterally, no crackles or wheezing  Cardiovascular: Regular rate and rhythm, normal S1 and S2, and no murmur noted  GI: Normal bowel sounds, soft, non-distended, non-tender  Skin/Integumen: No rashes, no cyanosis, no edema  Other:      Medications   All " medications were reviewed.    - MEDICATION INSTRUCTIONS -       - MEDICATION INSTRUCTIONS -       - MEDICATION INSTRUCTIONS -       HEParin 700 Units/hr (08/30/18 2112)     - MEDICATION INSTRUCTIONS -       - MEDICATION INSTRUCTIONS -       Reason ACE/ARB/ARNI order not selected         acetaminophen  500 mg Oral QAM     aspirin  81 mg Oral Daily     cefTRIAXone  1 g Intravenous Q24H     clopidogrel  75 mg Oral QPM     metoprolol succinate  50 mg Oral Daily     omeprazole  20 mg Oral QAM AC     rosuvastatin  20 mg Oral QPM     sodium chloride (PF)  3 mL Intracatheter Q8H     traMADol  50 mg Oral At Bedtime        Data     Recent Labs  Lab 08/31/18  0520 08/31/18  0105 08/30/18 2115 08/30/18  1525   WBC  --   --  8.9 6.5   HGB  --   --  14.1 13.8   MCV  --   --  86 87   PLT  --   --  267 238   INR  --   --  1.07  --      --   --  140   POTASSIUM 3.4  --   --  3.0*   CHLORIDE 107  --   --  102   CO2 27  --   --  31   BUN 16  --   --  14   CR 0.82  --   --  0.87   ANIONGAP 8  --   --  7   ADONIS 9.0  --   --  9.6   GLC 91  --   --  107*   TROPI <0.015 <0.015 <0.015 <0.015       Recent Results (from the past 24 hour(s))   NM Parathy Plnr Img w True Spect & CT    [facility]    NUCLEAR MEDICINE PARATHYROID DUAL ISOTOPE WITH TRUE (SPECT) AND CT   8/30/2018 3:08 PM    HISTORY:  Hyperparathyroidism (H).    COMPARISON: None.    TECHNIQUE:  The patient received I-123 orally and Tc-99 Cardiolite  injected intravenously. Dynamic images were obtained of the thyroid  gland following Cardiolite administration. Iodine images were obtained  of the thyroid with subtraction from the Cardiolite images. SPECT CT  images obtained for possible localization.    DOSE: 955uCi I-123 Caps P.O. @1055, 26.7mCi Tc99m Sestamibi inj Rt  Hand    FINDINGS:  The images of the thyroid gland on both the I-123 and  Cardiolite images demonstrate homogeneous appearance of the thyroid  gland. The subtraction images demonstrate no persistent area  of  increased radioisotope uptake to indicate a functioning parathyroid  adenoma.    Severe coronary atherosclerosis is noted.      Impression    IMPRESSION:  No evidence of persistent radioisotope uptake on  subtraction imaging to indicate a functioning parathyroid adenoma.  Severe coronary atherosclerosis noted.    RANDALL PUENTE MD   XR Chest 2 Views    Narrative    CHEST TWO VIEWS 8/30/2018 5:12 PM     HISTORY: Chest pain.      COMPARISON: August 16, 2015     FINDINGS: There are no acute infiltrates. The cardiac silhouette is  not enlarged. Pulmonary vasculature is unremarkable.      Impression    IMPRESSION: No acute disease.    MD Doug LLOYD MD  Text Page  (7am to 6pm)

## 2018-08-31 NOTE — UTILIZATION REVIEW
"  Admission Status; Secondary Review Determination         Under the authority of the Utilization Management Committee, the utilization review process indicated a secondary review on the above patient.  The review outcome is based on review of the medical records, discussions with staff, and applying clinical experience noted on the date of the review.          (x) Observation Status Appropriate - This patient does not meet hospital inpatient criteria and is placed in observation status. If this patient's primary payer is Medicare and was admitted as an inpatient, Condition Code 44 should be used and patient status changed to \"observation\".     RATIONALE FOR DETERMINATION   62 year old female wit hx of complex CAD (s/p multiple stenting before and after IV CABG), HTN, intermitent hypokalemia, new dx of hypercalcemia/hyperparathyroidism, Thorasic spinal compression fx (on ultram at bedtime) ,GERD who presents with new onset chest pain.  Patient has known coronary disease and multiple risk factors, serial troponins are undetectable, no convincing acute MI on EKG, stress echocardiogram no reversible ischemia however it was suboptimal exertion and plan is to do a coronary angiogram. The severity of illness, intensity of service provided, expected LOS and risk for adverse outcome make the care appropriate for further observation; however, doesn't meet criteria for hospital inpatient admission. Dr. Adler  notified of this determination.    This document was produced using voice recognition software.      The information on this document is developed by the utilization review team in order for the business office to ensure compliance.  This only denotes the appropriateness of proper admission status and does not reflect the quality of care rendered.         The definitions of Inpatient Status and Observation Status used in making the determination above are those provided in the CMS Coverage Manual, Chapter 1 and Chapter " 6, section 70.4.      Sincerely,     NANCY LAROSE MD    System Medical Director  Utilization Management  [facility].

## 2018-08-31 NOTE — PLAN OF CARE
Alert and oriented x4. VSS. Pain in chest rated 4/10. Tylenol somewhat effective. Up independently. Tele SB with 1 degree AVB. Heparin stopped. Plan for angio today will continue to monitor.     Angio done today, no interventions 4 fr cath used, site CDI no hematoma. CMS intact. VSS, next VS and site check, 1900, 2000.

## 2018-08-31 NOTE — PLAN OF CARE
Problem: Patient Care Overview  Goal: Plan of Care/Patient Progress Review  PT/CR: Order received; Patient awaiting further Cardiac workup for chest pain and also Cardiology consult. Nurse aware of hold until workup complete.

## 2018-09-01 ENCOUNTER — APPOINTMENT (OUTPATIENT)
Dept: PHYSICAL THERAPY | Facility: CLINIC | Age: 63
DRG: 287 | End: 2018-09-01
Payer: COMMERCIAL

## 2018-09-01 VITALS
HEIGHT: 62 IN | BODY MASS INDEX: 31.08 KG/M2 | DIASTOLIC BLOOD PRESSURE: 79 MMHG | TEMPERATURE: 97.6 F | SYSTOLIC BLOOD PRESSURE: 96 MMHG | OXYGEN SATURATION: 96 % | RESPIRATION RATE: 16 BRPM | WEIGHT: 168.9 LBS

## 2018-09-01 LAB
BACTERIA SPEC CULT: NORMAL
Lab: NORMAL
SPECIMEN SOURCE: NORMAL

## 2018-09-01 PROCEDURE — 97110 THERAPEUTIC EXERCISES: CPT | Mod: GP

## 2018-09-01 PROCEDURE — 25000132 ZZH RX MED GY IP 250 OP 250 PS 637: Performed by: INTERNAL MEDICINE

## 2018-09-01 PROCEDURE — G0378 HOSPITAL OBSERVATION PER HR: HCPCS

## 2018-09-01 PROCEDURE — 97161 PT EVAL LOW COMPLEX 20 MIN: CPT | Mod: GP

## 2018-09-01 PROCEDURE — 25000128 H RX IP 250 OP 636: Performed by: INTERNAL MEDICINE

## 2018-09-01 PROCEDURE — 93010 ELECTROCARDIOGRAM REPORT: CPT | Performed by: INTERNAL MEDICINE

## 2018-09-01 PROCEDURE — 99232 SBSQ HOSP IP/OBS MODERATE 35: CPT | Performed by: INTERNAL MEDICINE

## 2018-09-01 PROCEDURE — 93005 ELECTROCARDIOGRAM TRACING: CPT

## 2018-09-01 PROCEDURE — 99238 HOSP IP/OBS DSCHRG MGMT 30/<: CPT | Performed by: INTERNAL MEDICINE

## 2018-09-01 PROCEDURE — 40000193 ZZH STATISTIC PT WARD VISIT

## 2018-09-01 RX ORDER — ISOSORBIDE MONONITRATE 30 MG/1
30 TABLET, EXTENDED RELEASE ORAL DAILY
Status: DISCONTINUED | OUTPATIENT
Start: 2018-09-01 | End: 2018-09-01 | Stop reason: HOSPADM

## 2018-09-01 RX ORDER — ISOSORBIDE MONONITRATE 30 MG/1
30 TABLET, EXTENDED RELEASE ORAL DAILY
Qty: 30 TABLET | Refills: 1 | Status: SHIPPED | OUTPATIENT
Start: 2018-09-01 | End: 2018-09-17

## 2018-09-01 RX ORDER — POTASSIUM CHLORIDE 750 MG/1
10 TABLET, EXTENDED RELEASE ORAL DAILY
Qty: 90 TABLET | Refills: 1
Start: 2018-09-01 | End: 2024-08-06

## 2018-09-01 RX ORDER — FUROSEMIDE 10 MG/ML
20 INJECTION INTRAMUSCULAR; INTRAVENOUS ONCE
Status: COMPLETED | OUTPATIENT
Start: 2018-09-01 | End: 2018-09-01

## 2018-09-01 RX ADMIN — ALUMINUM HYDROXIDE, MAGNESIUM HYDROXIDE, SIMETHICONE 2 TABLET: 200; 200; 25 TABLET, CHEWABLE ORAL at 10:12

## 2018-09-01 RX ADMIN — OMEPRAZOLE 20 MG: 20 CAPSULE, DELAYED RELEASE ORAL at 07:42

## 2018-09-01 RX ADMIN — METOPROLOL SUCCINATE 50 MG: 50 TABLET, EXTENDED RELEASE ORAL at 08:53

## 2018-09-01 RX ADMIN — ACETAMINOPHEN 500 MG: 500 TABLET, FILM COATED ORAL at 08:53

## 2018-09-01 RX ADMIN — ASPIRIN 81 MG: 81 TABLET, COATED ORAL at 08:53

## 2018-09-01 RX ADMIN — ISOSORBIDE MONONITRATE 30 MG: 30 TABLET, EXTENDED RELEASE ORAL at 08:52

## 2018-09-01 RX ADMIN — CEFTRIAXONE SODIUM 1 G: 1 INJECTION, POWDER, FOR SOLUTION INTRAMUSCULAR; INTRAVENOUS at 06:28

## 2018-09-01 RX ADMIN — FUROSEMIDE 20 MG: 10 INJECTION, SOLUTION INTRAVENOUS at 10:39

## 2018-09-01 RX ADMIN — ACETAMINOPHEN 650 MG: 325 TABLET, FILM COATED ORAL at 10:12

## 2018-09-01 ASSESSMENT — PAIN DESCRIPTION - DESCRIPTORS: DESCRIPTORS: ACHING

## 2018-09-01 NOTE — PLAN OF CARE
Problem: Patient Care Overview  Goal: Plan of Care/Patient Progress Review  Outcome: Improving  Alert and oriented x4. VSS on RA. Tele SB with 1st degree AVB. Up independently in the room. Denies any pain/sob. R groin site wdl, cms intact. Will continue to monitor.

## 2018-09-01 NOTE — PROGRESS NOTES
09/01/18 1050   Quick Adds   Type of Visit Initial PT Evaluation   Living Environment   Lives With spouse   Living Arrangements house   Home Accessibility stairs to enter home;stairs within home   Number of Stairs to Enter Home 2   Number of Stairs Within Home 14   Transportation Available car;family or friend will provide   Living Environment Comment Pt lives with spouse, one floor set up, son lives upstairs so pt does negotiate stairs at times   Self-Care   Dominant Hand right   Usual Activity Tolerance good   Regular Exercise yes   Activity/Exercise Type walking   Exercise Amount/Frequency 30 mins;5-7 times/wk   Equipment Currently Used at Home none   Functional Level Prior   Ambulation 0-->independent   Transferring 0-->independent   Toileting 0-->independent   Bathing 0-->independent   Dressing 0-->independent   Eating 0-->independent   Communication 0-->understands/communicates without difficulty   Swallowing 0-->swallows foods/liquids without difficulty   Cognition 0 - no cognition issues reported   Fall history within last six months yes   Number of times patient has fallen within last six months 1   Which of the above functional risks had a recent onset or change? none   Prior Functional Level Comment Pt had a fall while doing [employer]EL[employer] resulting in compression fracture of T12, pt has brace to wear when walking for extended amount of time   General Information   Onset of Illness/Injury or Date of Surgery - Date 08/31/18   Referring Physician Giorgio Bowen MD   Patient/Family Goals Statement To go home   Pertinent History of Current Problem (include personal factors and/or comorbidities that impact the POC) Pt is 62 year old female adm on 8/31/18 with chest pain. Pt with history of CAD with stents placed, the most recent in Feb 2018 in [place]. Pt to cath lab, no intervention at this time. Pt also suffered a fall in May resulting in T12 compression fracture for which pt has a brace to wear if  "ambulating for extended period of time.   Precautions/Limitations no known precautions/limitations   Heart Disease Risk Factors Medical history;Family history;Overweight;High blood pressure;Dislipidemia   Cognitive Status Examination   Orientation orientation to person, place and time   Level of Consciousness alert   Pain Assessment   Patient Currently in Pain (3-4/10 back pain, denies any cardiac type pain)   Integumentary/Edema   Integumentary/Edema Comments B hands noted to have increased swelling, B LE do not appear to have swelling   Posture    Posture Not impaired   Range of Motion (ROM)   ROM Comment B LE ROM WFL   Strength   Strength Comments B LE strength WFL   Bed Mobility   Bed Mobility Comments Independent   Transfer Skills   Transfer Comments Independent   Gait   Gait Comments Independent   Balance   Balance Comments Good   Sensory Examination   Sensory Perception Comments Denies numbness/tingling   General Therapy Interventions   Planned Therapy Interventions risk factor education;home program guidelines;progressive activity/exercise   Clinical Impression   Criteria for Skilled Therapeutic Intervention yes, treatment indicated   PT Diagnosis Impaired aerobic exercise tolerance   Influenced by the following impairments Decreased activity tolerance   Functional limitations due to impairments Decreased tolerance to daily tasks   Clinical Presentation Stable/Uncomplicated   Clinical Presentation Rationale Current status, [facility]   Clinical Decision Making (Complexity) Low complexity   Therapy Frequency` daily   Predicted Duration of Therapy Intervention (days/wks) 3 days   Anticipated Discharge Disposition Home with Assist   Risk & Benefits of therapy have been explained Yes   Patient, Family & other staff in agreement with plan of care Yes   [facility] AM-PAC TM \"6 Clicks\"   2016, Trustees of [facility], under license to [employer].  All rights reserved.   6 Clicks Short Forms Basic Mobility " "Inpatient Short Form   [facility] AM-PAC  \"6 Clicks\" V.2 Basic Mobility Inpatient Short Form   1. Turning from your back to your side while in a flat bed without using bedrails? 4 - None   2. Moving from lying on your back to sitting on the side of a flat bed without using bedrails? 4 - None   3. Moving to and from a bed to a chair (including a wheelchair)? 4 - None   4. Standing up from a chair using your arms (e.g., wheelchair, or bedside chair)? 4 - None   5. To walk in hospital room? 4 - None   6. Climbing 3-5 steps with a railing? 4 - None   Basic Mobility Raw Score (Score out of 24.Lower scores equate to lower levels of function) 24   Total Evaluation Time   Total Evaluation Time (Minutes) 10     "

## 2018-09-01 NOTE — DISCHARGE INSTRUCTIONS
Going Home after  ANGIOGRAM       Name: Radhika Reynolds  Medical Record Number:  [id number]  Today's Date: September 1, 2018        For 24 hours:         Have an adult stay with you for 24 hours.         Relax and take it easy.         Drink plenty of fluids.         You may eat your normal diet, unless your doctor tells you otherwise.         Do NOT make any important or legal decisions.         Do NOT drive or operate machines at home or at work.         Do NOT drink alcohol.      Do NOT smoke.     Medicines:         If you have begun Plavix (clopidogrel), Effient (prasugrel), or Brilinta (ticagrelor), do not stop taking it until you talk to your heart doctor (cardiologist).         If you are on metformin (Glucophage), do not restart it until you have blood tests (within 2 to 3 days after discharge). When your doctor tells you it is safe, you may restart the metformin.         If you have stopped any other medicines, check with your nurse or provider about when to restart them.    Care of right groin site:         Remove the Band-Aid after 24 hours. If there is minor oozing, apply another Band-aid and remove it after 12 hours.          Do NOT take a bath, or use a hot tub or pool for at least 3 days. You may shower.          It is normal to have a small bruise or lump at the site.         Do not scrub the site.         Do not use lotion or powder near the puncture site for 3 days.         For the first 2 days: Do not stoop or squat. When you cough, sneeze or move your bowels, hold your hand over the puncture site and press gently.         Do not lift more than 10 pounds for at least 3 to 5 days.         For 2 days, do NOT have sex or do any heavy exercise.     If you start bleeding from the site in your groin:  Lie down flat and press firmly on the site.  Call your physician immediately, or, come to the emergency room.      Call 911 right away if you have bleeding that is heavy or does not stop.     Call your  doctor if:         You have a large or growing hard lump around the site.         The site is red, swollen, hot or tender.         Blood or fluid is draining from the site.         You have chills or a fever greater than 101 F (38 C).         Your leg or arm turns bluish, feels numb or cool.         You have hives, a rash or unusual itching.         ADDITIONAL INSTRUCTIONS:     [facility] [place]:   [phone] (7 days a week)      Cardiology Fellow on call (24 hours per day) at [facility]:   [phone] (ask for Cardiology Fellow on call)      Number where we can reach you:  ____________

## 2018-09-01 NOTE — PROGRESS NOTES
Doing well, plan for discharge today, Imdur added for possibility of coronary vasospasm, see discharge summary.    Yovany Roth MD

## 2018-09-01 NOTE — DISCHARGE SUMMARY
[facility]    Discharge Summary  Hospitalist    Date of Admission:  8/30/2018  Date of Discharge:  9/1/2018  Discharging Provider: Yovany Roth MD    Discharge Diagnoses   Acute chest pain  CAD  Compression fracture  Hypokalemia  UTI (treated)    History of Present Illness   Radhika Reynolds is a 62 year old female wit hx of complex CAD (s/p multiple stenting before and after IV CABG), HTN, intermitent hypokalemia, new dx of hypercalcemia/hyperparathyroidism, Thorasic spinal compression fx (on ultram at bedtime) ,GERD who presents with new onset chest pain.      Cardiac hx: see cardiology note dated 8/2015.   history of coronary artery disease and s/p drug-eluting stent placement to the LAD and obtuse marginal.  In 02/2013 s/p minimally invasive CABG x1 with LIMA to the LAD - complicated postoperatively by significant chest discomfort, consistent with a post-pericardiotomy syndrome -she was treated with colchicine, steroids and aspirin.       Pt states she has been doing well since cardiac stents placed in [place] in 2/2018 until two days ago. She states that the night before last she was awaken with moderate left sided chest pain that was sharp and pressure like that resolved after 1 hour spontaneously. No associated symptoms of sob, nausea, diaphoresis, palpitions, gerd or abd pain. No radiation.  The following day, yesterday, she felt fine. The chest pain was similar to the cp she has had in past when she has ultimately had cardiac stenting and before her CABG.    First episode she had her typical gerd and also at same time the cp she had last night before. She took and OTC med (gavisol) for her gerd and both pains resolved about one hour later. She was awoken again later in the night with her cp which is different from her GERD. This resolved spontaneously or she fell asleep again. She awoke this am with her chest pain (non gerd pain) which has been constant since all day, fluctuating in  intensity. Not worse with position changes. No associated n/diaphoresis, sob. No radiation. Not worse with excertion.  She was having a parathyroid gland NM scan today at [facility] and given symptoms she decided to present to ED.   In ED, she was moderatly hypertensive, afebrile. Nl sats. Nl exam. ekg without acute changes suggestive of ischemia.  K 3.0. Cbc nl. ddimer nl.  cxr normal. Troponin nl.   Her pain was improved with SL ntg. She has had mild persistence of her cp in the ED.     Hospital Course   Radhika Reynolds is a 62 year old female wit hx of complex CAD (s/p multiple stenting before and after IV CABG), HTN, intermitent hypokalemia, new dx of hypercalcemia/hyperparathyroidism, Thorasic spinal compression fx (on ultram at bedtime) ,GERD who presents with new onset chest pain.       Acute chest pain  Coronary artery disease    Assessment: complex CAD dz hx. Per pt hx of cardiac stenting 2011 ([facility]), 2012 ([facility]), 1V CABG ([facility]) and 3 stents in [place].   Now presents with intermitent rest pain at night and today with constant fluctuating pain all day.    - Troponin negative.     - LHC performed which showed patent stents and a small but patent LIMA graft.  Imdur added for possibility of coronary spasm.      GERD:  -  told by pcp recently to restart ppi. Has night time gerd different from her cp.   - continue PPI         Hyperlipidemia    Assessment: on statin    Plan: cont statin, am lipiids, goal LDL <70      Essential hypertension, benign  - good control now and in recent PCP office visit.   - On PTA toprol xl 50mg every day, dyazide (and lasix - lasix may be for htn or leg jd- not clear. No clear hx of chf)  - continue toprol, no change in diuretics       Hypokalemia    Assessment: hx of intermitent hypokalemia in past- even had hospitalization for this in past. On dyazide AND lasix. On kcl 10meq every other day but has been on every day in past.   - Increased OP Kcl frequency to daily        Possible  UTI  +symptoms. UA slightly abnl, ++ subcutaneous epi   - Urine cultures showed urogenital rachelle  - Got one day of ciprofloxacin, changed to rocephin on second day due to concern for skin reaction. Completed 3 days of antibiotics (Cipro 1day, Rocephin 2 days.)      Compression fx    Yovany Roth MD        Pending Results     Unresulted Labs Ordered in the Past 30 Days of this Admission     Date and Time Order Name Status Description    8/31/2018 1215 CRP cardiac risk In process           Code Status   Full Code       Primary Care Physician   Carlos Edwards    Physical Exam   Temp: 97.6  F (36.4  C) Temp src: Oral BP: 122/65   Heart Rate: 67 Resp: 16 SpO2: 98 % O2 Device: None (Room air)    Vitals:    08/30/18 2108 08/31/18 0200 09/01/18 0550   Weight: 75.3 kg (166 lb) 75.3 kg (166 lb 1.6 oz) 76.6 kg (168 lb 14.4 oz)     Vital Signs with Ranges  Temp:  [97.6  F (36.4  C)-98.4  F (36.9  C)] 97.6  F (36.4  C)  Heart Rate:  [54-67] 67  Resp:  [16] 16  BP: ()/(54-88) 122/65  SpO2:  [92 %-100 %] 98 %  I/O last 3 completed shifts:  In: 360 [P.O.:360]  Out: 900 [Urine:900]    Constitutional: Awake, alert, cooperative, no apparent distress.  Eyes: Conjunctiva and pupils examined and normal.  HEENT: Moist mucous membranes, normal dentition.  Respiratory: Clear to auscultation bilaterally, no crackles or wheezing.  Cardiovascular: Regular rate and rhythm,   GI: Soft, non-distended, non-tender, normal bowel sounds.    Skin: No rashes, no cyanosis, no edema.  Musculoskeletal: No joint swelling, erythema or tenderness.  Neurologic: Cranial nerves 2-12 intact, normal strength and sensation.    Discharge Disposition   Discharged to home  Condition at discharge: Stable    Consultations This Hospital Stay   PHARMACY TO DOSE HEPARIN  CARDIAC REHAB IP CONSULT  PHARMACY TO DOSE HEPARIN  CARDIOLOGY IP CONSULT  PHARMACY IP CONSULT  PHARMACY IP CONSULT  SMOKING CESSATION PROGRAM IP CONSULT  SMOKING CESSATION PROGRAM IP  CONSULT    Time Spent on this Encounter   IYovany, personally saw the patient today and spent less than or equal to 30 minutes discharging this patient.    Discharge Orders     Follow-up and recommended labs and tests    Keep previously scheduled cardiology appointments.  F/U with PCP in one week.     Full Code     Diet   Follow this diet upon discharge: Orders Placed This Encounter     Kosher Diet, < 2g sodium daily       Discharge Medications     Allergies   Allergies   Allergen Reactions     Amoxicillin      Lisinopril Cough     Norvasc [Amlodipine]      Penicillin G      Data   Results for orders placed or performed during the hospital encounter of 08/30/18   XR Chest 2 Views    Narrative    CHEST TWO VIEWS 8/30/2018 5:12 PM     HISTORY: Chest pain.      COMPARISON: August 16, 2015     FINDINGS: There are no acute infiltrates. The cardiac silhouette is  not enlarged. Pulmonary vasculature is unremarkable.      Impression    IMPRESSION: No acute disease.    CLIFF PAGAN MD

## 2018-09-01 NOTE — PROGRESS NOTES
[facility] Cardiology Progress Note             Assessment and Plan:   Assessment:   1.  Acute chest pain.   Chest discomfort similar to anginal episodes in the past  2.  CAD s/p CABG x 1 in 2013 and recent MAXIMO to LM/Cx/RCA.  -Coronary angiography yesterday showed patent stents and LIMA.  Recurrent chest pain today is left lateral and likely musculoskeletal.  Some LIMA spasm with angiography prompted initiating Imdur 30 mg daily.  We will assess condition again in two weeks.  With normal stress echo and angiography, this is unlikely to represent unstable angina.  3.  Post-pericardiotomy syndrome.  No obvious recurrence.  4.  HTN-Stable.  5.  Dyslipidemia  6.  Normal ESR.     Plan:   1.  Home today.  2.  F/U with MILENA in two weeks.    3.  Suggest exercise with recumbent bike.  4.  Imdur 30 mg daily.     Attestation:  I have reviewed today's vital signs, notes, medications, labs and imaging.  Care coordination / counseling time: 20 minutes  Face-to-face time: 30 minutes  Total time: 40 minutes    Hieu Leihg MD, [facility]  September 1, 2018 11:43 AM       Interval History:   Chest pain:sharp, achey and left sided radiating into the back.           Review of Systems:   A comprehensive review of systems was performed and found to be negative except as described in this note          Medications:       acetaminophen  1,000 mg Oral At Bedtime     acetaminophen  500 mg Oral QAM     aspirin  81 mg Oral Daily     cefTRIAXone  1 g Intravenous Q24H     clopidogrel  75 mg Oral QPM     isosorbide mononitrate  30 mg Oral Daily     metoprolol succinate  50 mg Oral Daily     omeprazole  20 mg Oral QAM AC     rosuvastatin  20 mg Oral QPM     sodium chloride (PF)  3 mL Intracatheter Q8H     sodium chloride (PF)  3 mL Intracatheter Q8H     acetaminophen, acetaminophen, acetaminophen, - MEDICATION INSTRUCTIONS -, - MEDICATION INSTRUCTIONS -, - MEDICATION INSTRUCTIONS -, HOLD MEDICATION, HYDROcodone-acetaminophen, lidocaine 4%,  "lidocaine 4%, lidocaine (buffered or not buffered), lidocaine (buffered or not buffered), magnesium sulfate, - MEDICATION INSTRUCTIONS -, naloxone, naloxone, nitroGLYcerin, ondansetron **OR** ondansetron, - MEDICATION INSTRUCTIONS -, potassium chloride, potassium chloride with lidocaine, potassium chloride, potassium chloride, potassium chloride, Reason ACE/ARB/ARNI order not selected, sodium chloride (PF), sodium chloride (PF)             Physical Exam:   Blood pressure 111/66, temperature 97.6  F (36.4  C), temperature source Oral, resp. rate 16, height 1.575 m (5' 2\"), weight 76.6 kg (168 lb 14.4 oz), SpO2 98 %.    Intake/Output Summary (Last 24 hours) at 09/01/18 1137  Last data filed at 09/01/18 0900   Gross per 24 hour   Intake              600 ml   Output              500 ml   Net              100 ml     Rhythm:  NSR     Constitutional:   awake, alert, cooperative, no apparent distress, and appears stated age     Lungs:   No increased work of breathing, good air exchange, clear to auscultation bilaterally, no crackles or wheezing     Cardiovascular:   Normal apical impulse, regular rate and rhythm, normal S1 and S2, no S3 or S4, and no murmur noted.  Healed sternotomy scar.     Abdomen:   No scars, normal bowel sounds, soft, non-distended, non-tender, no masses palpated, no hepatosplenomegally.     Neurologic:   Awake, alert, oriented to name, place and time.  Cranial nerves II-XII are grossly intact.  Motor is 5 out of 5 bilaterally.  Cerebellar finger to nose, heel to shin intact.  Sensory is intact.  Babinski down going, Romberg negative, and gait is normal.     Neuropsychiatric:   General: normal, calm and normal eye contact            Data:     Recent Labs  Lab 08/30/18  2115 08/30/18  1525   WBC 8.9 6.5   HGB 14.1 13.8   HCT 40.5 40.0   MCV 86 87    238       Recent Labs  Lab 08/31/18  0520 08/30/18  1525    140   POTASSIUM 3.4 3.0*   CHLORIDE 107 102   CO2 27 31   ANIONGAP 8 7   GLC 91 " 107*   BUN 16 14   CR 0.82 0.87   GFRESTIMATED 71 66   GFRESTBLACK 86 80   ADONIS 9.0 9.6     No results for input(s): CHOL, HDL, LDL, TRIG, CHOLHDLRATIO in the last 168 hours.    Recent Labs  Lab 08/30/18  2115   TSH 1.61       Recent Labs  Lab 08/31/18  0520 08/31/18  0105 08/30/18  2115   TROPI <0.015 <0.015 <0.015             EKG results:   I have reviewed this patient's telemetry with the following interpretation:        Rate: 67  Normal sinus rhythm      Other cardiac studies:   IMPRESSION:   1. Widely patent left main, RCA and circumflex stents  2. Small but patent LIMA graft    PLAN: The patient's angiogram demonstrated no significant obstructive  disease. Continue medical therapy and consider adding Imdur to medical  program    GLENYS PEREZ MD    Procedure  Stress Echo Complete. Contrast Optison.  _____________________________________________________________________________  __        Interpretation Summary  1. Average exercise capacity, 76% max HR achieved.  2. At peak exercise, 2/10 left chest, shoulder, and back discomfort.  3. This was a normal stress EKG with no evidence of stress-induced ischemia.  4. Rest echo: Normal left ventricular function and wall motion at rest. The  visual ejection fraction is estimated at 55-60%.  5. Stress echo: This was a normal stress echocardiogram with no evidence of  stress-induced ischemia. The visual ejection fraction is estimated at 65-70%.     Stress echo was normal in 2012.    Radiology:            Hieu Leigh MD 9/1/2018  11:37 AM

## 2018-09-01 NOTE — PLAN OF CARE
Problem: Patient Care Overview  Goal: Plan of Care/Patient Progress Review  Cardiac Rehab Discharge Summary    Reason for therapy discharge:    Discharged to home.    Progress towards therapy goal(s). See goals on Care Plan in [facility] electronic health record for goal details.  Goals not met.  Barriers to achieving goals:   discharge from facility.    Therapy recommendation(s):    No further therapy is recommended.

## 2018-09-01 NOTE — PLAN OF CARE
Problem: Patient Care Overview  Goal: Plan of Care/Patient Progress Review  Outcome: Adequate for Discharge Date Met: 09/01/18  Discharge instructions reviewed with pt and spouse.  Pt will discharge to home on imdur 30mg, filled at discharge pharmacy and sent with pt.  Pt did have episode of chest pain, no ekg changes, worse with deep breath.  Resolved on its own but pt did take tylenol and maalox around the same time.  Cards aware of pain, did not feel it was cardiac related and still ok with discharge.  Pt felt she was fluid overloaded and did not have PTA lasix scheduled today, Dr. Rabago did ordered 20 mg IV x1, pt felt relief was lasix started to work.   Pt will f/u with primary MD in 1 wk, pt will make own appt.  Pt will follow up with Dr. Mcconnell in clinic, order sent to schedule appt.  LDA removed and belongings returned.  Pt discharging to home with spouse.

## 2018-09-01 NOTE — PLAN OF CARE
Problem: Patient Care Overview  Goal: Plan of Care/Patient Progress Review  CR/PT: Pt is 62 year old female adm on 8/31/18 with chest pain, has cardiac history, went to cath lab, no intervention performed. Pt independent prior to admission, no assistive device.    Discharge Planner PT   Patient plan for discharge: Home  Current status: Pt is independent with bed mobility, transfers, ambulation without use of assistive device, does fatigue quickly with SOB. No change in vital signs with activity, pt does c/o SOB during ambulation.  Barriers to return to prior living situation: Decreased activity tolerance  Recommendations for discharge: Home with assist for household tasks  Rationale for recommendations: Pt mobilizing well though decreased activity tolerance, would benefit from assistance for household tasks as needed.       Entered by: Gloria White 09/01/2018 1:25 PM

## 2018-09-04 LAB
CRP SERPL HS-MCNC: 1.5 MG/L
INTERPRETATION ECG - MUSE: NORMAL

## 2018-09-06 ENCOUNTER — PRE VISIT (OUTPATIENT)
Dept: CARDIOLOGY | Facility: CLINIC | Age: 63
End: 2018-09-06

## 2018-09-06 DIAGNOSIS — I25.10 CORONARY ARTERY DISEASE INVOLVING NATIVE CORONARY ARTERY OF NATIVE HEART WITHOUT ANGINA PECTORIS: ICD-10-CM

## 2018-09-06 DIAGNOSIS — I31.9 PERICARDITIS: ICD-10-CM

## 2018-09-06 NOTE — TELEPHONE ENCOUNTER
Spoke with patient, her [place] provider was Dr. Palmer and her stents were place at [facility] in [facility] ([phone], [phone]). Faxed request for records

## 2018-09-08 LAB
INTERPRETATION ECG - MUSE: NORMAL
INTERPRETATION ECG - MUSE: NORMAL

## 2018-09-11 ENCOUNTER — TELEPHONE (OUTPATIENT)
Dept: CARDIOLOGY | Facility: CLINIC | Age: 63
End: 2018-09-11

## 2018-09-11 ENCOUNTER — OFFICE VISIT (OUTPATIENT)
Dept: SURGERY | Facility: CLINIC | Age: 63
End: 2018-09-11
Payer: COMMERCIAL

## 2018-09-11 VITALS
HEART RATE: 72 BPM | BODY MASS INDEX: 30.91 KG/M2 | SYSTOLIC BLOOD PRESSURE: 135 MMHG | WEIGHT: 168 LBS | HEIGHT: 62 IN | DIASTOLIC BLOOD PRESSURE: 83 MMHG

## 2018-09-11 DIAGNOSIS — E21.3 HYPERPARATHYROIDISM (H): ICD-10-CM

## 2018-09-11 PROCEDURE — 99204 OFFICE O/P NEW MOD 45 MIN: CPT | Performed by: SURGERY

## 2018-09-11 NOTE — LETTER
2018    Re: Radhika Reynolds - 1955    PCP:  Carlos Edwards [phone]     Chief Complaint: Hyperparathyroidism     Pt was seen in consultation from Carlos Edwards.     History of Present Illness:  Radhika Reynolds is a 63 year old female who presented with a history of elevated serum calcium.  This is been present for some time but was noted to be persistent and a PTH was ordered.  This was found to be elevated at approximately 138. Diagnosis of hyperparathyroidism was made.  Her calcium has been between 10.5 and 11.2. Most recent PTH was in the high 90s.  Patient has no history of thyroid or parathyroid issues. She was noted to have high calcium at the time of a compression fracture sustained while she was living in [place] last year.  Imaging was obtained and confirmed osteopenia.  No history of nephrolithiasis.  Does not complain of any recent changes in her mood or affect. She underwent a nuclear medicine parathyroid scan which did not reveal any obvious parathyroid adenoma.  Of note, patient has a long significant history of cardiac disease.  She underwent cardiac stenting in  and  and eventually underwent coronary artery bypass grafting in .  In February of this year she again experienced chest pain and 3 additional stents were placed.  She is on Plavix since that most recent stenting procedure and is supposed to stay on it for 1 year.  She also experienced chest pain at the time of her more recent parathyroid scan.     PMH:  Radhika Reynolds  has a past medical history of Chest pain; Coronary artery disease; Dyslipidemia; Gastro-oesophageal reflux disease; High cholesterol; History of angina; Hypertension; Hypokalemia; and Pericarditis, acute (2013).  PSH:  Radhika Reynolds  has a past surgical history that includes biopsy; Liposuction (location); GYN surgery; Cardiac surgery; Davinci bypass artery coronary (3/15/2013); Cardiac surgery; coronary artery bypass (3/15/2013);  "Heart Cath, Angioplasty (2/2012); and Heart Cath, Angioplasty.     Home medications and allergies reviewed.     Social History:  Radhika Reynolds  reports that she quit smoking about 5 years ago. Her smoking use included Cigarettes. She has a 7.50 pack-year smoking history. She has never used smokeless tobacco. She reports that she does not drink alcohol or use illicit drugs.  Family History:  Radhika Reynolds family history includes Hypertension in her mother; Myocardial Infarction in her father; Other - See Comments in her mother.     ROS:  The 10 point Review of Systems is negative other than noted in the HPI.  No issues with concentration.  No recent sleeplessness.  No bone pain or depression.     Physical Exam:  Blood pressure 135/83, pulse 72, height 5' 2\" (1.575 m), weight 168 lb (76.2 kg).  168 lbs 0 oz  Pleasant overweight female in no distress.  Patient has a pleasant affect and communicates well.   Pupils equal round and reactive to light.   No cervical lymphadenopathy or thyromegaly.   Lung fields clear, breathing comfortably.   Heart normal sinus rhythm.  No murmurs rubs or gallops.  Abdomen soft, nontender, nondistended.  Skin warm, dry.  No obvious rashes or lesions.     All new lab and imaging data was reviewed.  This includes recent lab studies, outside lab reports, and personal review of the nuclear medicine parathyroid scan.    Assessment/plan: Larry 63-year-old female with a very extensive cardiac history now presents with hyperparathyroidism.  She has some evidence of osteopenia and is at risk for osteoporosis.  In addition, she has sustained a compression fracture which may be loosely defined as a pathologic fracture.  These are indications for surgical expiration for her parathyroid disease.  The fact that she has a nonlocalizing scan does suggest that her surgery might be longer and more challenging.  I would likely recommend a 4D parathyroid high-resolution CT scan prior to surgery.  She is " supposed to stay on Plavix for 12 months after her stenting which would mean through February of this year.  Given the fact that surgery would be indicated but not emergent, I think it would be safest to keep her on Plavix and plan on surgery next spring.  I would order the neck CT somewhere closer to that date.  I understand that this period of hypercalcemia may cause progressive bone loss, but feel that the risk of bleeding if operating on Plavix is sufficiently high and really would like to avoid discontinuing the Plavix too soon.  I think risk of cardiac complications is higher than her risk of bone issues.  She could certainly be kept on a bisphosphonate until that time.  She was in agreement with our discussion and was planning on notifying the office sometime around the holidays and we can start planning for surgery.  Surgical co-morbities include severe coronary artery disease, history of tobacco abuse, hyperlipidemia.     Parviz Ingram M.D.  [facility]  [phone]

## 2018-09-11 NOTE — MR AVS SNAPSHOT
"              After Visit Summary   9/11/2018    Radhika Reynolds    MRN: [id number]           Patient Information     Date Of Birth          1955        Visit Information        Provider Department      9/11/2018 11:45 AM Dakotah Ingram MD [facility]       Follow-ups after your visit        Your next 10 appointments already scheduled     Sep 17, 2018 10:15 AM CDT   Return Visit with Tony Mcconnell MD   [facility] ([facility])    [address] [id number]  [facility] [id number] [phone] OPT 2              Who to contact     If you have questions or need follow up information about today's clinic visit or your schedule please contact [facility] directly at [phone].  Normal or non-critical lab and imaging results will be communicated to you by MyChart, letter or phone within 4 business days after the clinic has received the results. If you do not hear from us within 7 days, please contact the clinic through MyChart or phone. If you have a critical or abnormal lab result, we will notify you by phone as soon as possible.  Submit refill requests through [employer] or call your pharmacy and they will forward the refill request to us. Please allow 3 business days for your refill to be completed.          Additional Information About Your Visit        Care EveryWhere ID     This is your Care EveryWhere ID. This could be used by other organizations to access your [facility] medical records  [id number]        Your Vitals Were     Pulse Height BMI (Body Mass Index)             72 5' 2\" (1.575 m) 30.73 kg/m2          Blood Pressure from Last 3 Encounters:   09/11/18 135/83   09/01/18 96/79   08/26/15 120/80    Weight from Last 3 Encounters:   09/11/18 168 lb (76.2 kg)   09/01/18 168 lb 14.4 oz (76.6 kg)   08/26/15 173 lb (78.5 kg)              Today, you had the following     " No orders found for display       Primary Care Provider Office Phone # Fax #    Carlos Edwards [phone]       [id number] [place] [address]        Equal Access to Services     ASAEL NUNN : Hadii padma ku andrewo Soomaali, waaxda luqadaha, qaybta kaalmada adeegyada, yolette kramern kongomi vences laKatherinecorrie uriostegui. So [facility] [phone].    ATENCIÓN: Si habla español, tiene a patel disposición servicios gratuitos de asistencia lingüística. [facility] [phone].    We comply with applicable federal civil rights laws and [place] laws. We do not discriminate on the basis of race, color, national origin, age, disability, sex, sexual orientation, or gender identity.            Thank you!     Thank you for choosing [facility]  for your care. Our goal is always to provide you with excellent care. Hearing back from our patients is one way we can continue to improve our services. Please take a few minutes to complete the written survey that you may receive in the mail after your visit with us. Thank you!             Your Updated Medication List - Protect others around you: Learn how to safely use, store and throw away your medicines at www.disposemymeds.org.          This list is accurate as of 9/11/18 12:07 PM.  Always use your most recent med list.                   Brand Name Dispense Instructions for use Diagnosis    acetaminophen 500 MG tablet    TYLENOL     Take 500 mg by mouth every morning        aspirin 81 MG tablet      Take 81 mg by mouth daily        clopidogrel 75 MG tablet    PLAVIX     Take 75 mg by mouth every evening        furosemide 20 MG tablet    LASIX     Take 40 mg by mouth daily        isosorbide mononitrate 30 MG 24 hr tablet    IMDUR    30 tablet    Take 1 tablet (30 mg) by mouth daily    History of coronary artery bypass graft       metoprolol succinate 50 MG 24 hr tablet    TOPROL-XL     Take 50 mg by mouth daily        NITROGLYCERIN  SL      Place 0.4 mg under the tongue        potassium chloride 10 MEQ tablet    K-TAB,KLOR-CON    90 tablet    Take 1 tablet (10 mEq) by mouth daily in the PM    Hypokalemia       rosuvastatin 20 MG tablet    CRESTOR     Take 20 mg by mouth every evening        traMADol 50 MG tablet    ULTRAM     Take 50 mg by mouth At Bedtime        triamterene-hydrochlorothiazide 37.5-25 MG per capsule    DYAZIDE    30 capsule    Take 1 capsule by mouth daily    CAD (coronary artery disease)

## 2018-09-11 NOTE — PROGRESS NOTES
Surgery Consultation, [facility] Ingram MD    Radhika Reynolds MRN# [id number]   YOB: 1955 Age: 63 year old     PCP:  Carlos Edwards [phone]    Chief Complaint: Hyperparathyroidism    Pt was seen in consultation from Carlos Edwards.    History of Present Illness:  Radhika Reynolds is a 63 year old female who presented with a history of elevated serum calcium.  This is been present for some time but was noted to be persistent and a PTH was ordered.  This was found to be elevated at approximately 138.  Diagnosis of hyperparathyroidism was made.  Her calcium has been between 10.5 and 11.2.  Most recent PTH was in the high 90s.  Patient has no history of thyroid or parathyroid issues.  She was noted to have high calcium at the time of a compression fracture sustained while she was living in [place] last year.  Imaging was obtained and confirmed osteopenia.  No history of nephrolithiasis.  Does not complain of any recent changes in her mood or affect.  She underwent a nuclear medicine parathyroid scan which did not reveal any obvious parathyroid adenoma.  Of note, patient has a long significant history of cardiac disease.  She underwent cardiac stenting in 2011 and 2012 and eventually underwent coronary artery bypass grafting in 2013.  In February of this year she again experienced chest pain and 3 additional stents were placed.  She is on Plavix since that most recent stenting procedure and is supposed to stay on it for 1 year.  She also experienced chest pain at the time of her more recent parathyroid scan.    PMH:  Radhika Reynolds  has a past medical history of Chest pain; Coronary artery disease; Dyslipidemia; Gastro-oesophageal reflux disease; High cholesterol; History of angina; Hypertension; Hypokalemia; and Pericarditis, acute (March 2013).  PSH:  Radhika Reynolds  has a past surgical history that includes biopsy; Liposuction (location); GYN surgery; Cardiac  "surgery; Davinci bypass artery coronary (3/15/2013); Cardiac surgery; coronary artery bypass (3/15/2013); Heart Cath, Angioplasty (2/2012); and Heart Cath, Angioplasty.    Home medications and allergies reviewed.    Social History:  Radhika Reynolds  reports that she quit smoking about 5 years ago. Her smoking use included Cigarettes. She has a 7.50 pack-year smoking history. She has never used smokeless tobacco. She reports that she does not drink alcohol or use illicit drugs.  Family History:  Radhika Reynolds family history includes Hypertension in her mother; Myocardial Infarction in her father; Other - See Comments in her mother.    ROS:  The 10 point Review of Systems is negative other than noted in the HPI.  No issues with concentration.  No recent sleeplessness.  No bone pain or depression.    Physical Exam:  Blood pressure 135/83, pulse 72, height 5' 2\" (1.575 m), weight 168 lb (76.2 kg).  168 lbs 0 oz  Pleasant overweight female in no distress.  Patient has a pleasant affect and communicates well.   Pupils equal round and reactive to light.   No cervical lymphadenopathy or thyromegaly.   Lung fields clear, breathing comfortably.   Heart normal sinus rhythm.  No murmurs rubs or gallops.  Abdomen soft, nontender, nondistended.  Skin warm, dry.  No obvious rashes or lesions.    All new lab and imaging data was reviewed.  This includes recent lab studies, outside lab reports, and personal review of the nuclear medicine parathyroid scan.     Assessment/plan: Larry 63-year-old female with a very extensive cardiac history now presents with hyperparathyroidism.  She has some evidence of osteopenia and is at risk for osteoporosis.  In addition, she has sustained a compression fracture which may be loosely defined as a pathologic fracture.  These are indications for surgical expiration for her parathyroid disease.  The fact that she has a nonlocalizing scan does suggest that her surgery might be longer and more " challenging.  I would likely recommend a 4D parathyroid high-resolution CT scan prior to surgery.  She is supposed to stay on Plavix for 12 months after her stenting which would mean through February of this year.  Given the fact that surgery would be indicated but not emergent, I think it would be safest to keep her on Plavix and plan on surgery next spring.  I would order the neck CT somewhere closer to that date.  I understand that this period of hypercalcemia may cause progressive bone loss, but feel that the risk of bleeding if operating on Plavix is sufficiently high and really would like to avoid discontinuing the Plavix too soon.  I think risk of cardiac complications is higher than her risk of bone issues.  She could certainly be kept on a bisphosphonate until that time.  She was in agreement with our discussion and was planning on notifying the office sometime around the holidays and we can start planning for surgery.  Surgical co-morbities include severe coronary artery disease, history of tobacco abuse, hyperlipidemia.    Parviz Ingram M.D.  [facility]  [phone]    Please route or send letter to:  Primary Care Provider (PCP) and Referring Provider

## 2018-09-11 NOTE — TELEPHONE ENCOUNTER
Call from Myesha RN with Dr. Ingram's office, who is calling to inquire about a plavix hold for the patient's upcoming surgery. Patient is going to be having parathyroid surgery in early October. Reviewed with Myesha that the patient hasn't been seen here since 2015 but has an upcoming OV on 9/17/18 with Dr. Mcconnell. Reviewed with Myesha that the decision about a plavix hold can be determined at that visit. Myesha also stated that the patient will most likely need cardiac clearance as well. Reviewed with Myesha that Dr. Mcconnell's team will contact Dr. Ingram's office about the plavix hold answer once Dr. Mcconnell sees the patient on Monday 9/17. Will route to Team 2 nurses. Snapshot updated for patient's upcoming visit.

## 2018-09-17 ENCOUNTER — OFFICE VISIT (OUTPATIENT)
Dept: CARDIOLOGY | Facility: CLINIC | Age: 63
End: 2018-09-17
Attending: INTERNAL MEDICINE
Payer: COMMERCIAL

## 2018-09-17 ENCOUNTER — DOCUMENTATION ONLY (OUTPATIENT)
Dept: CARDIOLOGY | Facility: CLINIC | Age: 63
End: 2018-09-17

## 2018-09-17 VITALS
BODY MASS INDEX: 30.73 KG/M2 | HEART RATE: 71 BPM | HEIGHT: 62 IN | WEIGHT: 167 LBS | DIASTOLIC BLOOD PRESSURE: 83 MMHG | SYSTOLIC BLOOD PRESSURE: 143 MMHG

## 2018-09-17 DIAGNOSIS — R07.9 ACUTE CHEST PAIN: ICD-10-CM

## 2018-09-17 DIAGNOSIS — Z95.1 HISTORY OF CORONARY ARTERY BYPASS GRAFT: ICD-10-CM

## 2018-09-17 DIAGNOSIS — Z98.61 STATUS POST PERCUTANEOUS TRANSLUMINAL CORONARY ANGIOPLASTY: ICD-10-CM

## 2018-09-17 LAB
CHOLEST SERPL-MCNC: 173 MG/DL
HDLC SERPL-MCNC: 44 MG/DL
LDLC SERPL CALC-MCNC: 64 MG/DL
NONHDLC SERPL-MCNC: 129 MG/DL
TRIGL SERPL-MCNC: 327 MG/DL

## 2018-09-17 PROCEDURE — 80061 LIPID PANEL: CPT | Performed by: INTERNAL MEDICINE

## 2018-09-17 PROCEDURE — 99214 OFFICE O/P EST MOD 30 MIN: CPT | Performed by: INTERNAL MEDICINE

## 2018-09-17 PROCEDURE — 36415 COLL VENOUS BLD VENIPUNCTURE: CPT | Performed by: INTERNAL MEDICINE

## 2018-09-17 RX ORDER — METOPROLOL SUCCINATE 50 MG/1
50 TABLET, EXTENDED RELEASE ORAL DAILY
Qty: 90 TABLET | Refills: 3 | Status: SHIPPED | OUTPATIENT
Start: 2018-09-17 | End: 2023-07-10 | Stop reason: ALTCHOICE

## 2018-09-17 RX ORDER — ROSUVASTATIN CALCIUM 20 MG/1
20 TABLET, COATED ORAL EVERY EVENING
Qty: 15 TABLET | Refills: 1 | Status: SHIPPED | OUTPATIENT
Start: 2018-09-17 | End: 2022-09-26

## 2018-09-17 RX ORDER — EPINEPHRINE 0.3 MG/.3ML
0.3 INJECTION SUBCUTANEOUS PRN
Qty: 0.6 ML | Refills: 1 | Status: SHIPPED | OUTPATIENT
Start: 2018-09-17

## 2018-09-17 NOTE — LETTER
9/17/2018      Carlos Edwards MD  [address] [place] [id number] [address]      RE: Radhika Steinbergmindi       Dear Colleague,    I had the pleasure of seeing Radhika Reynolds in the [facility] [place] [facility].    Service Date: 09/17/2018      HISTORY OF PRESENT ILLNESS:  I had the pleasure of seeing Ms. Reynolds in followup at the [facility] today.  She is a very pleasant 63-year-old female who had been seen in our Cardiology Clinic until 2015 when she moved to [place].  She has a history of coronary artery disease and is status post drug-eluting stent placement to the LAD and obtuse marginal in the past.        In 02/2013 she underwent minimally invasive CABG x1 with LIMA to the LAD.  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  She was treated with colchicine, steroids and aspirin in over 2 years her symptoms slowly resolved and we were able to taper off most of her anti-inflammatory medications.      In 02/2018, she underwent repeat coronary angiography in [place] due to chest discomfort and was found to have significant progression of her coronary artery disease.  Ultimately, she underwent drug-eluting stent placement to left main, circumflex and right coronary arteries.  Her symptoms of chest discomfort resolved soon afterwards.      She returned to [place] earlier this year and was admitted at the end of August to [facility] with left-sided chest discomfort that was similar to her previous anginal episodes.  Given the high pretest likelihood of progression of coronary artery disease, I referred her for invasive coronary angiography.  This was performed on 08/31/2018 and demonstrated patent left main, RCA and circumflex stents.  The LIMA graft were small, but widely patent.      The patient was started on Imdur for possible  vasospasm at the time.  This led frequent headaches and she ultimately discontinued it.  Her chest discomfort has resolved.  Of note, I did order an ESR and CRP during her hospitalization, both of which were negative.      Other issues that have arisen since I last saw her included diagnosis of hyperparathyroidism for which surgery is being contemplated.  However, given the need for dual antiplatelet therapy until 2019, this has been deferred.      She has been dealing with probable allergic reaction to insect bites over the past few weeks, but otherwise is feeling well.  She denies any palpitations, syncope or presyncope.  She has been taking all her medications as prescribed.      IMPRESSION:   1.  Coronary artery disease, status post CABG x1 in  with recent drug-eluting stent placement to the left main, circumflex and RCA.   2.  Chronic chest discomfort after surgery which is now resolved.   3.  Hypertension.   4.  Dyslipidemia.      Ms. Rodriguez is doing well overall from a cardiovascular standpoint.  Her chest discomfort appears to have resolved.  At this point in time, I would recommend followup in 6 months to a year.      I did order a repeat lipid panel today given that there was an interval worsening in her LDL in 2018.        Regarding her parathyroid surgery, I do agree with deferring this until 2019.  At that time a year of dual antiplatelet therapy will be complete.      It was a pleasure seeing her in followup today.         CODY OCASIO MD             D: 2018   T: 2018   MT: LUIS      Name:     MIS RODRIGUEZ   MRN:      [id number]        Account:      [id number]   :      1955           Service Date: 2018      Document: [id number]         Outpatient Encounter Prescriptions as of 2018   Medication Sig Dispense Refill     acetaminophen (TYLENOL) 500 MG tablet Take 500 mg by mouth every morning       aspirin 81 MG tablet Take 81 mg by mouth daily        clopidogrel (PLAVIX) 75 MG tablet Take 75 mg by mouth every evening       EPINEPHrine (EPIPEN/ADRENACLICK/OR ANY BX GENERIC EQUIV) 0.3 MG/0.3ML injection 2-pack Inject 0.3 mLs (0.3 mg) into the muscle as needed for anaphylaxis 0.6 mL 1     furosemide (LASIX) 20 MG tablet Take 40 mg by mouth daily        metoprolol succinate (TOPROL-XL) 50 MG 24 hr tablet Take 1 tablet (50 mg) by mouth daily 90 tablet 3     NITROGLYCERIN SL Place 0.4 mg under the tongue       potassium chloride (K-TAB,KLOR-CON) 10 MEQ tablet Take 1 tablet (10 mEq) by mouth daily in the PM 90 tablet 1     rosuvastatin (CRESTOR) 20 MG tablet Take 1 tablet (20 mg) by mouth every evening 15 tablet 1     traMADol (ULTRAM) 50 MG tablet Take 50 mg by mouth At Bedtime       triamterene-hydrochlorothiazide (DYAZIDE) 37.5-25 MG per capsule Take 1 capsule by mouth daily 30 capsule 0     [DISCONTINUED] isosorbide mononitrate (IMDUR) 30 MG 24 hr tablet Take 1 tablet (30 mg) by mouth daily (Patient not taking: Reported on 9/17/2018) 30 tablet 1     [DISCONTINUED] metoprolol succinate (TOPROL-XL) 50 MG 24 hr tablet Take 50 mg by mouth daily       [DISCONTINUED] rosuvastatin (CRESTOR) 20 MG tablet Take 20 mg by mouth every evening        No facility-administered encounter medications on file as of 9/17/2018.        Again, thank you for allowing me to participate in the care of your patient.      Sincerely,    Tony Mcconnell MD     [facility]

## 2018-09-17 NOTE — LETTER
9/17/2018    Carlos Edwards MD  [facility] [address] [address]    RE: Radhika Reynolds       Dear Colleague,    I had the pleasure of seeing Radhika Reynolds in the [facility].    HPI and Plan:   See dictation    Orders Placed This Encounter   Procedures     Lipid Profile       Orders Placed This Encounter   Medications     metoprolol succinate (TOPROL-XL) 50 MG 24 hr tablet     Sig: Take 1 tablet (50 mg) by mouth daily     Dispense:  90 tablet     Refill:  3     rosuvastatin (CRESTOR) 20 MG tablet     Sig: Take 1 tablet (20 mg) by mouth every evening     Dispense:  15 tablet     Refill:  1     EPINEPHrine (EPIPEN/ADRENACLICK/OR ANY BX GENERIC EQUIV) 0.3 MG/0.3ML injection 2-pack     Sig: Inject 0.3 mLs (0.3 mg) into the muscle as needed for anaphylaxis     Dispense:  0.6 mL     Refill:  1       Medications Discontinued During This Encounter   Medication Reason     isosorbide mononitrate (IMDUR) 30 MG 24 hr tablet      metoprolol succinate (TOPROL-XL) 50 MG 24 hr tablet Reorder     rosuvastatin (CRESTOR) 20 MG tablet Reorder         Encounter Diagnoses   Name Primary?     Acute chest pain      Status post percutaneous transluminal coronary angioplasty      History of coronary artery bypass graft        CURRENT MEDICATIONS:  Current Outpatient Prescriptions   Medication Sig Dispense Refill     acetaminophen (TYLENOL) 500 MG tablet Take 500 mg by mouth every morning       aspirin 81 MG tablet Take 81 mg by mouth daily       clopidogrel (PLAVIX) 75 MG tablet Take 75 mg by mouth every evening       EPINEPHrine (EPIPEN/ADRENACLICK/OR ANY BX GENERIC EQUIV) 0.3 MG/0.3ML injection 2-pack Inject 0.3 mLs (0.3 mg) into the muscle as needed for anaphylaxis 0.6 mL 1     furosemide (LASIX) 20 MG tablet Take 40 mg by mouth daily        metoprolol succinate (TOPROL-XL) 50 MG 24 hr tablet Take 1 tablet (50 mg) by mouth daily 90 tablet 3     NITROGLYCERIN SL Place 0.4 mg  under the tongue       potassium chloride (K-TAB,KLOR-CON) 10 MEQ tablet Take 1 tablet (10 mEq) by mouth daily in the PM 90 tablet 1     rosuvastatin (CRESTOR) 20 MG tablet Take 1 tablet (20 mg) by mouth every evening 15 tablet 1     traMADol (ULTRAM) 50 MG tablet Take 50 mg by mouth At Bedtime       triamterene-hydrochlorothiazide (DYAZIDE) 37.5-25 MG per capsule Take 1 capsule by mouth daily 30 capsule 0     [DISCONTINUED] metoprolol succinate (TOPROL-XL) 50 MG 24 hr tablet Take 50 mg by mouth daily       [DISCONTINUED] rosuvastatin (CRESTOR) 20 MG tablet Take 20 mg by mouth every evening          ALLERGIES     Allergies   Allergen Reactions     Amoxicillin      Lisinopril Cough     Norvasc [Amlodipine]      Penicillin G        PAST MEDICAL HISTORY:  Past Medical History:   Diagnosis Date     Chest pain      Coronary artery disease     cath 8/2018: patent stents; cath 2/2018: MAXIMO to left main, RCA and circumflex; CABG 3/15/2013: CARTY to LAD, cath 2/12/2012: MAXIMO to LAD and MAXIMO to OM     Dyslipidemia      Gastro-oesophageal reflux disease      High cholesterol      History of angina      Hypertension      Hypokalemia      Pericarditis, acute March 2013    unspecified       PAST SURGICAL HISTORY:  Past Surgical History:   Procedure Laterality Date     BIOPSY       CARDIAC SURGERY       CARDIAC SURGERY       CORONARY ARTERY BYPASS  3/15/2013     DAVINCI BYPASS ARTERY CORONARY  3/15/2013    Procedure: DAVINCI BYPASS ARTERY CORONARY;  Davinci Assisted  Coronary Artery Bypass Graft X 1 using Left Internal Mammary Artery off pump;  Surgeon: Alejandro Mcclendon MD;  Location: [facility] OR     GYN SURGERY       HEART CATH, ANGIOPLASTY  2/2012    MAXIMO to the LAD and obtuse marginal     HEART CATH, ANGIOPLASTY      cath 2/2018: MAXIMO to left main, RCA and circumflex     LIPOSUCTION (LOCATION)         FAMILY HISTORY:  Family History   Problem Relation Age of Onset     Hypertension Mother      Other - See Comments Mother      back  "problems     Myocardial Infarction Father        SOCIAL HISTORY:  Social History     Social History     Marital status:      Spouse name: N/A     Number of children: N/A     Years of education: N/A     Social History Main Topics     Smoking status: Former Smoker     Packs/day: 0.25     Years: 30.00     Types: Cigarettes     Quit date: 3/23/2013     Smokeless tobacco: Never Used      Comment: 1-2 cigarettes per week     Alcohol use No     Drug use: No     Sexual activity: Not Asked     Other Topics Concern     Caffeine Concern No     coffee 1 cup a day     Sleep Concern Yes     hard to fall back to sleep     Stress Concern No     Special Diet Yes     low fat     Exercise No     not currently     Seat Belt Yes     Social History Narrative       Review of Systems:  Skin:  Negative       Eyes:  Negative      ENT:  Negative      Respiratory:  Positive for dyspnea on exertion;sleep apnea;CPAP     Cardiovascular:    Positive for;fatigue chest ache   Gastroenterology: Negative      Genitourinary:  not assessed      Musculoskeletal:  Positive for back pain;joint swelling;joint stiffness    Neurologic:  Positive for   neuropathy  Psychiatric:  Negative      Heme/Lymph/Imm:  not assessed      Endocrine:  Negative        Physical Exam:  Vitals: /83  Pulse 71  Ht 1.575 m (5' 2\")  Wt 75.8 kg (167 lb)  BMI 30.54 kg/m2    Constitutional:  cooperative, alert and oriented, well developed, well nourished, in no acute distress        Skin:  warm and dry to the touch, no apparent skin lesions or masses noted          Head:           Eyes:           Lymph:      ENT:  no pallor or cyanosis, dentition good        Neck:           Respiratory:  normal breath sounds, clear to auscultation, normal A-P diameter, normal symmetry, normal respiratory excursion, no use of accessory muscles tender to palpation       Cardiac: regular rhythm, normal S1/S2, no S3 or S4, apical impulse not displaced, no murmurs, gallops or rubs          "       pulses full and equal, no bruits auscultated                                        GI:  abdomen soft, non-tender, BS normoactive, no mass, no HSM, no bruits        Extremities and Muscular Skeletal:  no deformities, clubbing, cyanosis, erythema observed              Neurological:           Psych:           CC  Hieu Leigh MD  [address]  [place] [id number]                Service Date: 09/17/2018      HISTORY OF PRESENT ILLNESS:  I had the pleasure of seeing Ms. Reynolds in followup at the [facility] today.  She is a very pleasant 63-year-old female who had been seen in our Cardiology Clinic until 2015 when she moved to [place].  She has a history of coronary artery disease and is status post drug-eluting stent placement to the LAD and obtuse marginal in the past.        In 02/2013 she underwent minimally invasive CABG x1 with LIMA to the LAD.  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  She was treated with colchicine, steroids and aspirin in over 2 years her symptoms slowly resolved and we were able to taper off most of her anti-inflammatory medications.      In 02/2018, she underwent repeat coronary angiography in [place] due to chest discomfort and was found to have significant progression of her coronary artery disease.  Ultimately, she underwent drug-eluting stent placement to left main, circumflex and right coronary arteries.  Her symptoms of chest discomfort resolved soon afterwards.      She returned to [place] earlier this year and was admitted at the end of August to [facility] with left-sided chest discomfort that was similar to her previous anginal episodes.  Given the high pretest likelihood of progression of coronary artery disease, I referred her for invasive coronary angiography.  This was performed on 08/31/2018 and  demonstrated patent left main, RCA and circumflex stents.  The LIMA graft were small, but widely patent.      The patient was started on Imdur for possible vasospasm at the time.  This led frequent headaches and she ultimately discontinued it.  Her chest discomfort has resolved.  Of note, I did order an ESR and CRP during her hospitalization, both of which were negative.      Other issues that have arisen since I last saw her included diagnosis of hyperparathyroidism for which surgery is being contemplated.  However, given the need for dual antiplatelet therapy until 2019, this has been deferred.      She has been dealing with probable allergic reaction to insect bites over the past few weeks, but otherwise is feeling well.  She denies any palpitations, syncope or presyncope.  She has been taking all her medications as prescribed.      IMPRESSION:   1.  Coronary artery disease, status post CABG x1 in  with recent drug-eluting stent placement to the left main, circumflex and RCA.   2.  Chronic chest discomfort after surgery which is now resolved.   3.  Hypertension.   4.  Dyslipidemia.      Ms. Rodriguez is doing well overall from a cardiovascular standpoint.  Her chest discomfort appears to have resolved.  At this point in time, I would recommend followup in 6 months to a year.      I did order a repeat lipid panel today given that there was an interval worsening in her LDL in 2018.        Regarding her parathyroid surgery, I do agree with deferring this until 2019.  At that time a year of dual antiplatelet therapy will be complete.      It was a pleasure seeing her in followup today.         CODY OCASIO MD             D: 2018   T: 2018   MT: LUIS      Name:     MIS RODRIGUEZ   MRN:      [id number]        Account:      [id number]   :      1955           Service Date: 2018      Document: [id number]       Thank you for allowing me to participate in the care of your  patient.      Sincerely,     Tony Mcconnell MD     [facility]    cc:   Hieu Leigh MD  [address]  [place] [id number]

## 2018-09-17 NOTE — PROGRESS NOTES
Lipid panel 9/17/18 noted, ordered post visit same day. LDL=64.   Patient is taking crestor 20mg daily.  Will message Dr. Mcconnell to review

## 2018-09-17 NOTE — PROGRESS NOTES
Service Date: 09/17/2018      HISTORY OF PRESENT ILLNESS:  I had the pleasure of seeing Ms. Reynolds in followup at the [facility] today.  She is a very pleasant 63-year-old female who had been seen in our Cardiology Clinic until 2015 when she moved to [place].  She has a history of coronary artery disease and is status post drug-eluting stent placement to the LAD and obtuse marginal in the past.        In 02/2013 she underwent minimally invasive CABG x1 with LIMA to the LAD.  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  She was treated with colchicine, steroids and aspirin in over 2 years her symptoms slowly resolved and we were able to taper off most of her anti-inflammatory medications.      In 02/2018, she underwent repeat coronary angiography in [place] due to chest discomfort and was found to have significant progression of her coronary artery disease.  Ultimately, she underwent drug-eluting stent placement to left main, circumflex and right coronary arteries.  Her symptoms of chest discomfort resolved soon afterwards.      She returned to [place] earlier this year and was admitted at the end of August to [facility] with left-sided chest discomfort that was similar to her previous anginal episodes.  Given the high pretest likelihood of progression of coronary artery disease, I referred her for invasive coronary angiography.  This was performed on 08/31/2018 and demonstrated patent left main, RCA and circumflex stents.  The LIMA graft were small, but widely patent.      The patient was started on Imdur for possible vasospasm at the time.  This led frequent headaches and she ultimately discontinued it.  Her chest discomfort has resolved.  Of note, I did order an ESR and CRP during her hospitalization, both of which were negative.      Other issues that have  arisen since I last saw her included diagnosis of hyperparathyroidism for which surgery is being contemplated.  However, given the need for dual antiplatelet therapy until 2019, this has been deferred.      She has been dealing with probable allergic reaction to insect bites over the past few weeks, but otherwise is feeling well.  She denies any palpitations, syncope or presyncope.  She has been taking all her medications as prescribed.      IMPRESSION:   1.  Coronary artery disease, status post CABG x1 in  with recent drug-eluting stent placement to the left main, circumflex and RCA.   2.  Chronic chest discomfort after surgery which is now resolved.   3.  Hypertension.   4.  Dyslipidemia.      Ms. Rodriguez is doing well overall from a cardiovascular standpoint.  Her chest discomfort appears to have resolved.  At this point in time, I would recommend followup in 6 months to a year.      I did order a repeat lipid panel today given that there was an interval worsening in her LDL in 2018.        Regarding her parathyroid surgery, I do agree with deferring this until 2019.  At that time a year of dual antiplatelet therapy will be complete.      It was a pleasure seeing her in followup today.         CODY OCASIO MD             D: 2018   T: 2018   MT: LUIS      Name:     MIS RODRIGUEZ   MRN:      [id number]        Account:      [id number]   :      1955           Service Date: 2018      Document: [id number]

## 2018-09-17 NOTE — MR AVS SNAPSHOT
After Visit Summary   9/17/2018    Radhika Reynolds    MRN: [id number]           Patient Information     Date Of Birth          1955        Visit Information        Provider Department      9/17/2018 10:15 AM Tony Mcconnell MD [facility]        Today's Diagnoses     Acute chest pain        Status post percutaneous transluminal coronary angioplasty        History of coronary artery bypass graft           Follow-ups after your visit        Your next 10 appointments already scheduled     Sep 17, 2018 11:00 AM CDT   LAB with [facility]   [facility] ([facility])    [address] [place] [address] [address]  [facility] [address]   [phone]           Please do not eat 10-12 hours before your appointment if you are coming in fasting for labs on lipids, cholesterol, or glucose (sugar). This does not apply to pregnant women. Water, hot tea and black coffee (with nothing added) are okay. Do not drink other fluids, diet soda or chew gum.              Future tests that were ordered for you today     Open Future Orders        Priority Expected Expires Ordered    Lipid Profile Routine 9/17/2018 9/17/2019 9/17/2018            Who to contact     If you have questions or need follow up information about today's clinic visit or your schedule please contact [facility] directly at [phone].  Normal or non-critical lab and imaging results will be communicated to you by MyChart, letter or phone within 4 business days after the clinic has received the results. If you do not hear from us within 7 days, please contact the clinic through MyChart or phone. If you have a critical or abnormal lab result, we will notify you by phone as soon as possible.  Submit refill requests through [employer] or call your pharmacy and they will forward the refill request to us. Please allow 3 business days for your refill  "to be completed.          Additional Information About Your Visit        Care EveryWhere ID     This is your Care EveryWhere ID. This could be used by other organizations to access your [place] medical records  [id number]        Your Vitals Were     Pulse Height BMI (Body Mass Index)             71 1.575 m (5' 2\") 30.54 kg/m2          Blood Pressure from Last 3 Encounters:   09/17/18 143/83   09/11/18 135/83   09/01/18 96/79    Weight from Last 3 Encounters:   09/17/18 75.8 kg (167 lb)   09/11/18 76.2 kg (168 lb)   09/01/18 76.6 kg (168 lb 14.4 oz)              We Performed the Following     Follow-Up with Cardiac Advanced Practice Provider          Today's Medication Changes          These changes are accurate as of 9/17/18 10:55 AM.  If you have any questions, ask your nurse or doctor.               Start taking these medicines.        Dose/Directions    EPINEPHrine 0.3 MG/0.3ML injection 2-pack   Commonly known as:  EPIPEN/ADRENACLICK/or ANY BX GENERIC EQUIV   Used for:  Acute chest pain   Started by:  Tony Mcconnell MD        Dose:  0.3 mg   Inject 0.3 mLs (0.3 mg) into the muscle as needed for anaphylaxis   Quantity:  0.6 mL   Refills:  1         Stop taking these medicines if you haven't already. Please contact your care team if you have questions.     isosorbide mononitrate 30 MG 24 hr tablet   Commonly known as:  IMDUR   Stopped by:  Tony Mcconnell MD                Where to get your medicines      These medications were sent to [id number] - [address] [address]     Phone:  [phone]     EPINEPHrine 0.3 MG/0.3ML injection 2-pack    rosuvastatin 20 MG tablet         These medications were sent to [facility] - [address], [facility] [address]     Phone:  [phone]     metoprolol succinate 50 MG 24 hr tablet                Primary Care Provider Office Phone " # Fax #    Carlos Edwards [phone]       [facility] [address] [address]        Equal Access to Services     ASAEL NUNN : Hadii aad ku andrewo Somattyali, waaxda luqadaha, qaybta kaalmada lorenzo, yolette vences laKatherinecorrie uriostegui. So [facility] [phone].    ATENCIÓN: Si habla español, tiene a patel disposición servicios gratuitos de asistencia lingüística. Llame al [phone].    We comply with applicable federal civil rights laws and [place] laws. We do not discriminate on the basis of race, color, national origin, age, disability, sex, sexual orientation, or gender identity.            Thank you!     Thank you for choosing [facility]  for your care. Our goal is always to provide you with excellent care. Hearing back from our patients is one way we can continue to improve our services. Please take a few minutes to complete the written survey that you may receive in the mail after your visit with us. Thank you!             Your Updated Medication List - Protect others around you: Learn how to safely use, store and throw away your medicines at www.disposemymeds.org.          This list is accurate as of 9/17/18 10:55 AM.  Always use your most recent med list.                   Brand Name Dispense Instructions for use Diagnosis    acetaminophen 500 MG tablet    TYLENOL     Take 500 mg by mouth every morning        aspirin 81 MG tablet      Take 81 mg by mouth daily        clopidogrel 75 MG tablet    PLAVIX     Take 75 mg by mouth every evening        EPINEPHrine 0.3 MG/0.3ML injection 2-pack    EPIPEN/ADRENACLICK/or ANY BX GENERIC EQUIV    0.6 mL    Inject 0.3 mLs (0.3 mg) into the muscle as needed for anaphylaxis    Acute chest pain       furosemide 20 MG tablet    LASIX     Take 40 mg by mouth daily        metoprolol succinate 50 MG 24 hr tablet    TOPROL-XL    90 tablet    Take 1 tablet (50 mg) by mouth daily     Status post percutaneous transluminal coronary angioplasty       NITROGLYCERIN SL      Place 0.4 mg under the tongue        potassium chloride 10 MEQ tablet    K-TAB,KLOR-CON    90 tablet    Take 1 tablet (10 mEq) by mouth daily in the PM    Hypokalemia       rosuvastatin 20 MG tablet    CRESTOR    15 tablet    Take 1 tablet (20 mg) by mouth every evening    Status post percutaneous transluminal coronary angioplasty       traMADol 50 MG tablet    ULTRAM     Take 50 mg by mouth At Bedtime        triamterene-hydrochlorothiazide 37.5-25 MG per capsule    DYAZIDE    30 capsule    Take 1 capsule by mouth daily    CAD (coronary artery disease)

## 2018-09-18 NOTE — PROGRESS NOTES
HPI and Plan:   See dictation    Orders Placed This Encounter   Procedures     Lipid Profile       Orders Placed This Encounter   Medications     metoprolol succinate (TOPROL-XL) 50 MG 24 hr tablet     Sig: Take 1 tablet (50 mg) by mouth daily     Dispense:  90 tablet     Refill:  3     rosuvastatin (CRESTOR) 20 MG tablet     Sig: Take 1 tablet (20 mg) by mouth every evening     Dispense:  15 tablet     Refill:  1     EPINEPHrine (EPIPEN/ADRENACLICK/OR ANY BX GENERIC EQUIV) 0.3 MG/0.3ML injection 2-pack     Sig: Inject 0.3 mLs (0.3 mg) into the muscle as needed for anaphylaxis     Dispense:  0.6 mL     Refill:  1       Medications Discontinued During This Encounter   Medication Reason     isosorbide mononitrate (IMDUR) 30 MG 24 hr tablet      metoprolol succinate (TOPROL-XL) 50 MG 24 hr tablet Reorder     rosuvastatin (CRESTOR) 20 MG tablet Reorder         Encounter Diagnoses   Name Primary?     Acute chest pain      Status post percutaneous transluminal coronary angioplasty      History of coronary artery bypass graft        CURRENT MEDICATIONS:  Current Outpatient Prescriptions   Medication Sig Dispense Refill     acetaminophen (TYLENOL) 500 MG tablet Take 500 mg by mouth every morning       aspirin 81 MG tablet Take 81 mg by mouth daily       clopidogrel (PLAVIX) 75 MG tablet Take 75 mg by mouth every evening       EPINEPHrine (EPIPEN/ADRENACLICK/OR ANY BX GENERIC EQUIV) 0.3 MG/0.3ML injection 2-pack Inject 0.3 mLs (0.3 mg) into the muscle as needed for anaphylaxis 0.6 mL 1     furosemide (LASIX) 20 MG tablet Take 40 mg by mouth daily        metoprolol succinate (TOPROL-XL) 50 MG 24 hr tablet Take 1 tablet (50 mg) by mouth daily 90 tablet 3     NITROGLYCERIN SL Place 0.4 mg under the tongue       potassium chloride (K-TAB,KLOR-CON) 10 MEQ tablet Take 1 tablet (10 mEq) by mouth daily in the PM 90 tablet 1     rosuvastatin (CRESTOR) 20 MG tablet Take 1 tablet (20 mg) by mouth every evening 15 tablet 1     traMADol  (ULTRAM) 50 MG tablet Take 50 mg by mouth At Bedtime       triamterene-hydrochlorothiazide (DYAZIDE) 37.5-25 MG per capsule Take 1 capsule by mouth daily 30 capsule 0     [DISCONTINUED] metoprolol succinate (TOPROL-XL) 50 MG 24 hr tablet Take 50 mg by mouth daily       [DISCONTINUED] rosuvastatin (CRESTOR) 20 MG tablet Take 20 mg by mouth every evening          ALLERGIES     Allergies   Allergen Reactions     Amoxicillin      Lisinopril Cough     Norvasc [Amlodipine]      Penicillin G        PAST MEDICAL HISTORY:  Past Medical History:   Diagnosis Date     Chest pain      Coronary artery disease     cath 8/2018: patent stents; cath 2/2018: MAXIMO to left main, RCA and circumflex; CABG 3/15/2013: CARTY to LAD, cath 2/12/2012: MAXIMO to LAD and MAXIMO to OM     Dyslipidemia      Gastro-oesophageal reflux disease      High cholesterol      History of angina      Hypertension      Hypokalemia      Pericarditis, acute March 2013    unspecified       PAST SURGICAL HISTORY:  Past Surgical History:   Procedure Laterality Date     BIOPSY       CARDIAC SURGERY       CARDIAC SURGERY       CORONARY ARTERY BYPASS  3/15/2013     DAVINCI BYPASS ARTERY CORONARY  3/15/2013    Procedure: DAVINCI BYPASS ARTERY CORONARY;  Davinci Assisted  Coronary Artery Bypass Graft X 1 using Left Internal Mammary Artery off pump;  Surgeon: Alejandro Mcclendon MD;  Location: [facility] OR     GYN SURGERY       HEART CATH, ANGIOPLASTY  2/2012    MAXIMO to the LAD and obtuse marginal     HEART CATH, ANGIOPLASTY      cath 2/2018: MAXIMO to left main, RCA and circumflex     LIPOSUCTION (LOCATION)         FAMILY HISTORY:  Family History   Problem Relation Age of Onset     Hypertension Mother      Other - See Comments Mother      back problems     Myocardial Infarction Father        SOCIAL HISTORY:  Social History     Social History     Marital status:      Spouse name: N/A     Number of children: N/A     Years of education: N/A     Social History Main Topics      "Smoking status: Former Smoker     Packs/day: 0.25     Years: 30.00     Types: Cigarettes     Quit date: 3/23/2013     Smokeless tobacco: Never Used      Comment: 1-2 cigarettes per week     Alcohol use No     Drug use: No     Sexual activity: Not Asked     Other Topics Concern     Caffeine Concern No     coffee 1 cup a day     Sleep Concern Yes     hard to fall back to sleep     Stress Concern No     Special Diet Yes     low fat     Exercise No     not currently     Seat Belt Yes     Social History Narrative       Review of Systems:  Skin:  Negative       Eyes:  Negative      ENT:  Negative      Respiratory:  Positive for dyspnea on exertion;sleep apnea;CPAP     Cardiovascular:    Positive for;fatigue chest ache   Gastroenterology: Negative      Genitourinary:  not assessed      Musculoskeletal:  Positive for back pain;joint swelling;joint stiffness    Neurologic:  Positive for   neuropathy  Psychiatric:  Negative      Heme/Lymph/Imm:  not assessed      Endocrine:  Negative        Physical Exam:  Vitals: /83  Pulse 71  Ht 1.575 m (5' 2\")  Wt 75.8 kg (167 lb)  BMI 30.54 kg/m2    Constitutional:  cooperative, alert and oriented, well developed, well nourished, in no acute distress        Skin:  warm and dry to the touch, no apparent skin lesions or masses noted          Head:           Eyes:           Lymph:      ENT:  no pallor or cyanosis, dentition good        Neck:           Respiratory:  normal breath sounds, clear to auscultation, normal A-P diameter, normal symmetry, normal respiratory excursion, no use of accessory muscles tender to palpation       Cardiac: regular rhythm, normal S1/S2, no S3 or S4, apical impulse not displaced, no murmurs, gallops or rubs                pulses full and equal, no bruits auscultated                                        GI:  abdomen soft, non-tender, BS normoactive, no mass, no HSM, no bruits        Extremities and Muscular Skeletal:  no deformities, clubbing, " cyanosis, erythema observed              Neurological:           Psych:           CC  Hieu Leigh MD  [address]  [place] [address]

## 2019-03-27 ENCOUNTER — HOSPITAL ENCOUNTER (OUTPATIENT)
Facility: CLINIC | Age: 64
End: 2019-03-27
Attending: SURGERY | Admitting: SURGERY
Payer: COMMERCIAL

## 2019-03-27 DIAGNOSIS — E21.3 HYPERPARATHYROIDISM (H): Primary | ICD-10-CM

## 2019-05-16 ENCOUNTER — HEALTH MAINTENANCE LETTER (OUTPATIENT)
Age: 64
End: 2019-05-16

## 2019-11-08 ENCOUNTER — HEALTH MAINTENANCE LETTER (OUTPATIENT)
Age: 64
End: 2019-11-08

## 2020-01-05 NOTE — PROGRESS NOTES
Dr. Mcconnell's reply after reviewing  FLP's - No changes thanks.   Patient called with results.    Statement Selected

## 2020-02-23 ENCOUNTER — HEALTH MAINTENANCE LETTER (OUTPATIENT)
Age: 65
End: 2020-02-23

## 2020-12-06 ENCOUNTER — HEALTH MAINTENANCE LETTER (OUTPATIENT)
Age: 65
End: 2020-12-06

## 2021-09-25 ENCOUNTER — HEALTH MAINTENANCE LETTER (OUTPATIENT)
Age: 66
End: 2021-09-25

## 2021-11-20 ENCOUNTER — HEALTH MAINTENANCE LETTER (OUTPATIENT)
Age: 66
End: 2021-11-20

## 2022-01-15 ENCOUNTER — HEALTH MAINTENANCE LETTER (OUTPATIENT)
Age: 67
End: 2022-01-15

## 2022-09-25 NOTE — PROGRESS NOTES
HPI and Plan:     I had the pleasure of seeing Ms. Reynolds in followup at the [facility] today. She is a very pleasant 67-year-old female who last saw in September 2018.  Previously she had been seen in our Cardiology Clinic until 2015 when she moved to [place].  She has a history of coronary artery disease and is status post drug-eluting stent placement to the LAD and obtuse marginal in the past.        In 02/2013 she underwent minimally invasive CABG x1 with LIMA to the LAD.  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  She was treated with colchicine, steroids and aspirin in over 2 years her symptoms slowly resolved and we were able to taper off most of her anti-inflammatory medications.      In 02/2018, she underwent repeat coronary angiography in [place] due to chest discomfort and was found to have significant progression of her coronary artery disease.  Ultimately, she underwent drug-eluting stent placement to left main, circumflex and right coronary arteries.  Her symptoms of chest discomfort resolved soon afterwards.      She returned to [place] earlier this year and was admitted at the end of August 2018 to [facility] [place] [facility] with left-sided chest discomfort that was similar to her previous anginal episodes.  Given the high pretest likelihood of progression of coronary artery disease, I referred her for invasive coronary angiography.  This was performed on 08/31/2018 and demonstrated patent left main, RCA and circumflex stents.  The LIMA graft were small, but widely patent.      After her visit in 2018 she moved to [place] and due to insurance issues was unable to see as despite spending her summers in [place].  Since she turned 67 however she is now on Medicare and wishes to reestablish care with our group.    She was seen at the [facility] in  [place] in 2021 at which point an echocardiogram was within normal limits and medical therapy for coronary artery disease was advised.  Today she continues to feel well, although she is recovering from COVID-19 which she contracted earlier this month.  She received monoclonal antibodies and is currently on azithromycin.  She still has a chronic cough.    From a cardiovascular standpoint, however, she feels well.  She denies any chest discomfort or shortness of breath.  She denies any syncope or presyncope.  She was fairly active participating in [employer] classes earlier but sustained a vertebral fracture after a fall which has impaired her mobility somewhat.     Her physical exam is dictated below.    I reviewed the results of her lipid panel on 9/23/2022 which demonstrated total cholesterol 150, LDL of 83 and triglycerides of 143.  This is on rosuvastatin 20 mg daily.    IMPRESSION:   1.  Coronary artery disease, status post CABG x1 in 2013 with  drug-eluting stent placement to the left main, circumflex and RCA in 2018.  Remains on aspirin 81 mg daily and clopidogrel 75 mg daily.  2.  Chronic chest discomfort after surgery which is now resolved.   3.  Hypertension.  Blood pressure well controlled on Dyazide 37.5/25 mg daily and metoprolol XL 50 mg daily.  4.  Dyslipidemia.  LDL slightly above goal on rosuvastatin 20 mg daily.  5.  Recent COVID-19 infection.    The patient is doing well overall from a cardiovascular standpoint without evidence of angina or congestive heart failure.  I have recommended she increase her rosuvastatin to 40 mg daily for more effective lipid-lowering.    She is scheduled to return to [place] in early October.  I will plan on seeing her when she returns assuming her clinical status remains stable.    It was a pleasure seeing her today.                   CURRENT MEDICATIONS:  Current Outpatient Medications   Medication Sig Dispense Refill     acetaminophen (TYLENOL) 500 MG tablet Take 500 mg  by mouth every morning       aspirin 81 MG tablet Take 81 mg by mouth daily       clopidogrel (PLAVIX) 75 MG tablet Take 75 mg by mouth every evening       EPINEPHrine (EPIPEN/ADRENACLICK/OR ANY BX GENERIC EQUIV) 0.3 MG/0.3ML injection 2-pack Inject 0.3 mLs (0.3 mg) into the muscle as needed for anaphylaxis 0.6 mL 1     furosemide (LASIX) 20 MG tablet Take 40 mg by mouth daily        metoprolol succinate (TOPROL-XL) 50 MG 24 hr tablet Take 1 tablet (50 mg) by mouth daily 90 tablet 3     NITROGLYCERIN SL Place 0.4 mg under the tongue       potassium chloride (K-TAB,KLOR-CON) 10 MEQ tablet Take 1 tablet (10 mEq) by mouth daily in the PM 90 tablet 1     rosuvastatin (CRESTOR) 20 MG tablet Take 1 tablet (20 mg) by mouth every evening 15 tablet 1     traMADol (ULTRAM) 50 MG tablet Take 50 mg by mouth At Bedtime       triamterene-hydrochlorothiazide (DYAZIDE) 37.5-25 MG per capsule Take 1 capsule by mouth daily 30 capsule 0       ALLERGIES     Allergies   Allergen Reactions     Amoxicillin      Lisinopril Cough     Norvasc [Amlodipine]      Penicillin G        PAST MEDICAL HISTORY:  Past Medical History:   Diagnosis Date     Chest pain      Coronary artery disease     cath 8/2018: patent stents; cath 2/2018: MAXIMO to left main, RCA and circumflex; CABG 3/15/2013: CARTY to LAD, cath 2/12/2012: MAXIMO to LAD and MAXIMO to OM     Dyslipidemia      Gastro-oesophageal reflux disease      High cholesterol      History of angina      Hypertension      Hypokalemia      Pericarditis, acute March 2013    unspecified       PAST SURGICAL HISTORY:  Past Surgical History:   Procedure Laterality Date     BIOPSY       CARDIAC SURGERY       CARDIAC SURGERY       CORONARY ARTERY BYPASS  3/15/2013     DAVINCI BYPASS ARTERY CORONARY  3/15/2013    Procedure: DAVINCI BYPASS ARTERY CORONARY;  Davinci Assisted  Coronary Artery Bypass Graft X 1 using Left Internal Mammary Artery off pump;  Surgeon: Alejandro Mcclendon MD;  Location: [facility] OR     GYN  SURGERY       HEART CATH, ANGIOPLASTY  2012    MAXIMO to the LAD and obtuse marginal     HEART CATH, ANGIOPLASTY      cath 2018: MAXIMO to left main, RCA and circumflex     LIPOSUCTION (LOCATION)         FAMILY HISTORY:  Family History   Problem Relation Age of Onset     Hypertension Mother      Other - See Comments Mother         back problems     Myocardial Infarction Father        SOCIAL HISTORY:  Social History     Socioeconomic History     Marital status:    Tobacco Use     Smoking status: Former Smoker     Packs/day: 0.25     Years: 30.00     Pack years: 7.50     Types: Cigarettes     Quit date: 3/23/2013     Years since quittin.5     Smokeless tobacco: Never Used     Tobacco comment: 1-2 cigarettes per week   Substance and Sexual Activity     Alcohol use: No     Drug use: No   Other Topics Concern     Caffeine Concern No     Comment: coffee 1 cup a day     Sleep Concern Yes     Comment: hard to fall back to sleep     Stress Concern No     Special Diet Yes     Comment: low fat     Exercise No     Comment: not currently     Seat Belt Yes       Review of Systems:  Skin:          Eyes:         ENT:         Respiratory:          Cardiovascular:         Gastroenterology:        Genitourinary:         Musculoskeletal:         Neurologic:         Psychiatric:         Heme/Lymph/Imm:         Endocrine:           Physical Exam:  Vitals: There were no vitals taken for this visit.    Constitutional:  cooperative, alert and oriented, well developed, well nourished, in no acute distress        Skin:  warm and dry to the touch, no apparent skin lesions or masses noted          Head:  normocephalic, no masses or lesions        Eyes:  pupils equal and round        Lymph:      ENT:  no pallor or cyanosis, dentition good        Neck:  JVP normal        Respiratory:  clear to auscultation         Cardiac: regular rhythm                                                         GI:  abdomen soft        Extremities and  Muscular Skeletal:  no edema              Neurological:  no gross motor deficits        Psych:  Alert and Oriented x 3        CC  Referred Self, MD  No address on file

## 2022-09-26 ENCOUNTER — OFFICE VISIT (OUTPATIENT)
Dept: CARDIOLOGY | Facility: CLINIC | Age: 67
End: 2022-09-26
Payer: MEDICARE

## 2022-09-26 VITALS
SYSTOLIC BLOOD PRESSURE: 131 MMHG | BODY MASS INDEX: 31.65 KG/M2 | WEIGHT: 172 LBS | DIASTOLIC BLOOD PRESSURE: 83 MMHG | HEIGHT: 62 IN | HEART RATE: 76 BPM

## 2022-09-26 DIAGNOSIS — Z98.61 STATUS POST PERCUTANEOUS TRANSLUMINAL CORONARY ANGIOPLASTY: ICD-10-CM

## 2022-09-26 DIAGNOSIS — R09.89 BRUIT: Primary | ICD-10-CM

## 2022-09-26 PROCEDURE — 99204 OFFICE O/P NEW MOD 45 MIN: CPT | Performed by: INTERNAL MEDICINE

## 2022-09-26 RX ORDER — ROSUVASTATIN CALCIUM 40 MG/1
40 TABLET, COATED ORAL EVERY EVENING
Qty: 90 TABLET | Refills: 3 | Status: SHIPPED | OUTPATIENT
Start: 2022-09-26 | End: 2023-07-07 | Stop reason: DRUGHIGH

## 2022-09-26 RX ORDER — PREDNISOLONE SODIUM PHOSPHATE 30 MG/1
30 TABLET, ORALLY DISINTEGRATING ORAL DAILY
COMMUNITY
End: 2024-08-06

## 2022-09-26 RX ORDER — CELECOXIB 200 MG/1
200 CAPSULE ORAL DAILY
COMMUNITY
End: 2024-08-06

## 2022-09-26 NOTE — LETTER
9/26/2022    Carlos Edwards MD  [address]  [facility] [address]    RE: Radhika Steinbergjenifferbrittaney       Dear Colleague,     I had the pleasure of seeing Radhika Reynolds in the [facility].  HPI and Plan:     I had the pleasure of seeing Ms. Reynolds in followup at the [facility] today. She is a very pleasant 67-year-old female who last saw in September 2018.  Previously she had been seen in our Cardiology Clinic until 2015 when she moved to [place].  She has a history of coronary artery disease and is status post drug-eluting stent placement to the LAD and obtuse marginal in the past.        In 02/2013 she underwent minimally invasive CABG x1 with LIMA to the LAD.  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  She was treated with colchicine, steroids and aspirin in over 2 years her symptoms slowly resolved and we were able to taper off most of her anti-inflammatory medications.      In 02/2018, she underwent repeat coronary angiography in [place] due to chest discomfort and was found to have significant progression of her coronary artery disease.  Ultimately, she underwent drug-eluting stent placement to left main, circumflex and right coronary arteries.  Her symptoms of chest discomfort resolved soon afterwards.      She returned to [place] earlier this year and was admitted at the end of August 2018 to [facility] with left-sided chest discomfort that was similar to her previous anginal episodes.  Given the high pretest likelihood of progression of coronary artery disease, I referred her for invasive coronary angiography.  This was performed on 08/31/2018 and demonstrated patent left main, RCA and circumflex stents.  The LIMA graft were small, but widely patent.      After her visit in 2018 she moved to [place] and due to insurance issues was  unable to see as despite spending her summers in [place].  Since she turned 67 however she is now on Medicare and wishes to reestablish care with our group.    She was seen at the [facility] in [place] in 2021 at which point an echocardiogram was within normal limits and medical therapy for coronary artery disease was advised.  Today she continues to feel well, although she is recovering from COVID-19 which she contracted earlier this month.  She received monoclonal antibodies and is currently on azithromycin.  She still has a chronic cough.    From a cardiovascular standpoint, however, she feels well.  She denies any chest discomfort or shortness of breath.  She denies any syncope or presyncope.  She was fairly active participating in [employer] classes earlier but sustained a vertebral fracture after a fall which has impaired her mobility somewhat.     Her physical exam is dictated below.    I reviewed the results of her lipid panel on 9/23/2022 which demonstrated total cholesterol 150, LDL of 83 and triglycerides of 143.  This is on rosuvastatin 20 mg daily.    IMPRESSION:   1.  Coronary artery disease, status post CABG x1 in 2013 with  drug-eluting stent placement to the left main, circumflex and RCA in 2018.  Remains on aspirin 81 mg daily and clopidogrel 75 mg daily.  2.  Chronic chest discomfort after surgery which is now resolved.   3.  Hypertension.  Blood pressure well controlled on Dyazide 37.5/25 mg daily and metoprolol XL 50 mg daily.  4.  Dyslipidemia.  LDL slightly above goal on rosuvastatin 20 mg daily.  5.  Recent COVID-19 infection.    The patient is doing well overall from a cardiovascular standpoint without evidence of angina or congestive heart failure.  I have recommended she increase her rosuvastatin to 40 mg daily for more effective lipid-lowering.    She is scheduled to return to [place] in early October.  I will plan on seeing her when she returns assuming her clinical status remains  stable.    It was a pleasure seeing her today.                   CURRENT MEDICATIONS:  Current Outpatient Medications   Medication Sig Dispense Refill     acetaminophen (TYLENOL) 500 MG tablet Take 500 mg by mouth every morning       aspirin 81 MG tablet Take 81 mg by mouth daily       clopidogrel (PLAVIX) 75 MG tablet Take 75 mg by mouth every evening       EPINEPHrine (EPIPEN/ADRENACLICK/OR ANY BX GENERIC EQUIV) 0.3 MG/0.3ML injection 2-pack Inject 0.3 mLs (0.3 mg) into the muscle as needed for anaphylaxis 0.6 mL 1     furosemide (LASIX) 20 MG tablet Take 40 mg by mouth daily        metoprolol succinate (TOPROL-XL) 50 MG 24 hr tablet Take 1 tablet (50 mg) by mouth daily 90 tablet 3     NITROGLYCERIN SL Place 0.4 mg under the tongue       potassium chloride (K-TAB,KLOR-CON) 10 MEQ tablet Take 1 tablet (10 mEq) by mouth daily in the PM 90 tablet 1     rosuvastatin (CRESTOR) 20 MG tablet Take 1 tablet (20 mg) by mouth every evening 15 tablet 1     traMADol (ULTRAM) 50 MG tablet Take 50 mg by mouth At Bedtime       triamterene-hydrochlorothiazide (DYAZIDE) 37.5-25 MG per capsule Take 1 capsule by mouth daily 30 capsule 0       ALLERGIES     Allergies   Allergen Reactions     Amoxicillin      Lisinopril Cough     Norvasc [Amlodipine]      Penicillin G        PAST MEDICAL HISTORY:  Past Medical History:   Diagnosis Date     Chest pain      Coronary artery disease     cath 8/2018: patent stents; cath 2/2018: MAXIMO to left main, RCA and circumflex; CABG 3/15/2013: CARTY to LAD, cath 2/12/2012: MAXIMO to LAD and MAXIMO to OM     Dyslipidemia      Gastro-oesophageal reflux disease      High cholesterol      History of angina      Hypertension      Hypokalemia      Pericarditis, acute March 2013    unspecified       PAST SURGICAL HISTORY:  Past Surgical History:   Procedure Laterality Date     BIOPSY       CARDIAC SURGERY       CARDIAC SURGERY       CORONARY ARTERY BYPASS  3/15/2013     DAVINCI BYPASS ARTERY CORONARY  3/15/2013     Procedure: DAVINCI BYPASS ARTERY CORONARY;  Davinci Assisted  Coronary Artery Bypass Graft X 1 using Left Internal Mammary Artery off pump;  Surgeon: Alejandro Mcclendon MD;  Location: [facility] OR     GYN SURGERY       HEART CATH, ANGIOPLASTY  2012    MAXIMO to the LAD and obtuse marginal     HEART CATH, ANGIOPLASTY      cath 2018: MAXIMO to left main, RCA and circumflex     LIPOSUCTION (LOCATION)         FAMILY HISTORY:  Family History   Problem Relation Age of Onset     Hypertension Mother      Other - See Comments Mother         back problems     Myocardial Infarction Father        SOCIAL HISTORY:  Social History     Socioeconomic History     Marital status:    Tobacco Use     Smoking status: Former Smoker     Packs/day: 0.25     Years: 30.00     Pack years: 7.50     Types: Cigarettes     Quit date: 3/23/2013     Years since quittin.5     Smokeless tobacco: Never Used     Tobacco comment: 1-2 cigarettes per week   Substance and Sexual Activity     Alcohol use: No     Drug use: No   Other Topics Concern     Caffeine Concern No     Comment: coffee 1 cup a day     Sleep Concern Yes     Comment: hard to fall back to sleep     Stress Concern No     Special Diet Yes     Comment: low fat     Exercise No     Comment: not currently     Seat Belt Yes       Review of Systems:  Skin:          Eyes:         ENT:         Respiratory:          Cardiovascular:         Gastroenterology:        Genitourinary:         Musculoskeletal:         Neurologic:         Psychiatric:         Heme/Lymph/Imm:         Endocrine:           Physical Exam:  Vitals: There were no vitals taken for this visit.    Constitutional:  cooperative, alert and oriented, well developed, well nourished, in no acute distress        Skin:  warm and dry to the touch, no apparent skin lesions or masses noted          Head:  normocephalic, no masses or lesions        Eyes:  pupils equal and round        Lymph:      ENT:  no pallor or cyanosis, dentition  good        Neck:  JVP normal        Respiratory:  clear to auscultation         Cardiac: regular rhythm                                                         GI:  abdomen soft        Extremities and Muscular Skeletal:  no edema              Neurological:  no gross motor deficits        Psych:  Alert and Oriented x 3        CC  Referred Self,     Thank you for allowing me to participate in the care of your patient.      Sincerely,     Tony Mcconnell MD   [facility]

## 2022-10-03 ENCOUNTER — HOSPITAL ENCOUNTER (OUTPATIENT)
Dept: ULTRASOUND IMAGING | Facility: CLINIC | Age: 67
Discharge: HOME | End: 2022-10-03
Attending: INTERNAL MEDICINE | Admitting: INTERNAL MEDICINE
Payer: MEDICARE

## 2022-10-03 DIAGNOSIS — R09.89 BRUIT: ICD-10-CM

## 2022-10-03 PROCEDURE — 93880 EXTRACRANIAL BILAT STUDY: CPT | Mod: 26 | Performed by: INTERNAL MEDICINE

## 2022-10-03 PROCEDURE — 93880 EXTRACRANIAL BILAT STUDY: CPT

## 2022-10-04 ENCOUNTER — TELEPHONE (OUTPATIENT)
Dept: CARDIOLOGY | Facility: CLINIC | Age: 67
End: 2022-10-04

## 2022-10-04 NOTE — TELEPHONE ENCOUNTER
Carotid US routed to Dr Mcconnell to review. Ordered at visit 9/26/22 for bruit. Pt has orders to return in one year.     Impression:  No hemodynamically significant obstructive carotid artery disease  bilaterally (less than 50% stenosis by Doppler).  Heterogenous plaque at bilateral carotid bulbs out of proportion to age.

## 2023-01-05 ENCOUNTER — TELEPHONE (OUTPATIENT)
Dept: CARDIOLOGY | Facility: CLINIC | Age: 68
End: 2023-01-05

## 2023-01-05 NOTE — TELEPHONE ENCOUNTER
Spoke with patient, she is in [place] only from May-September and asks that her recent office note and US carotid results be sent to her [place] cardiologist: Dr Lashell Mcconnell at [facility] and [facility] , Fax number is [phone].    Records faxed as requested.

## 2023-01-05 NOTE — TELEPHONE ENCOUNTER
[facility]    Phone Message    May a detailed message be left on voicemail: yes     Reason for Call: Other: Pt would like her records sent to her Cardio Dr Lashell Mcconnell at [facility] , Fax number is [phone] and Telephone is [phone], Please reach out to pt to discuss if any questions     Action Taken: Message routed to:  [facility] ([facility] Cardio    Travel Screening: Not Applicable

## 2023-01-07 ENCOUNTER — HEALTH MAINTENANCE LETTER (OUTPATIENT)
Age: 68
End: 2023-01-07

## 2023-06-26 ENCOUNTER — APPOINTMENT (OUTPATIENT)
Dept: URBAN - METROPOLITAN AREA CLINIC 255 | Age: 68
Setting detail: DERMATOLOGY
End: 2023-06-28

## 2023-06-26 VITALS — WEIGHT: 146 LBS | RESPIRATION RATE: 14 BRPM | HEIGHT: 62 IN

## 2023-06-26 DIAGNOSIS — B35.4 TINEA CORPORIS: ICD-10-CM

## 2023-06-26 PROBLEM — L08.9 LOCAL INFECTION OF THE SKIN AND SUBCUTANEOUS TISSUE, UNSPECIFIED: Status: ACTIVE | Noted: 2023-06-26

## 2023-06-26 PROCEDURE — OTHER COUNSELING: OTHER

## 2023-06-26 PROCEDURE — OTHER MIPS QUALITY: OTHER

## 2023-06-26 PROCEDURE — OTHER PRESCRIPTION MEDICATION MANAGEMENT: OTHER

## 2023-06-26 PROCEDURE — 99204 OFFICE O/P NEW MOD 45 MIN: CPT

## 2023-06-26 PROCEDURE — OTHER ADDITIONAL NOTES: OTHER

## 2023-06-26 ASSESSMENT — LOCATION DETAILED DESCRIPTION DERM: LOCATION DETAILED: RIGHT LATERAL BREAST 7-8:00 REGION

## 2023-06-26 ASSESSMENT — LOCATION ZONE DERM: LOCATION ZONE: TRUNK

## 2023-06-26 ASSESSMENT — LOCATION SIMPLE DESCRIPTION DERM: LOCATION SIMPLE: RIGHT BREAST

## 2023-06-26 NOTE — PROCEDURE: PRESCRIPTION MEDICATION MANAGEMENT
Plan: RTC 1 month for recheck, biopsy if central papule not resolved. Low threshold for repeat mammogram.
Continue Regimen: Topical terbinafine BID until clear, then daily x 6 weeks to ensure resolution and prevent recurrence
Render In Strict Bullet Format?: No
Detail Level: Zone

## 2023-06-26 NOTE — PROCEDURE: ADDITIONAL NOTES
Detail Level: Simple
Render Risk Assessment In Note?: no
Additional Notes: The patient is encouraged to continue use of topical OTC Lotrimin. The patient will follow up in one month if this lesion does not resolve. If the small bump remains at the follow up, a biopsy will be conducted.

## 2023-06-26 NOTE — HPI: SKIN LESION
What Type Of Note Output Would You Prefer (Optional)?: Standard Output
Has Your Skin Lesion Been Treated?: not been treated
Is This A New Presentation, Or A Follow-Up?: Skin Lesion
Additional History: The patient reports this lesion is itchy and began hardened. The patient has been using Lotrimin and reports that this has been helpful in reducing redness. The patient began a low carb diet this month.

## 2023-07-03 ENCOUNTER — MYC MEDICAL ADVICE (OUTPATIENT)
Dept: CARDIOLOGY | Facility: CLINIC | Age: 68
End: 2023-07-03
Payer: MEDICARE

## 2023-07-03 DIAGNOSIS — E78.5 HYPERLIPIDEMIA: Primary | ICD-10-CM

## 2023-07-06 NOTE — TELEPHONE ENCOUNTER
So we have 2 options. 1) Decrease rosuvastatin to 20 MG daily and add zetia 10 MG daily or 2) Start repatha and potentially decrease rosuvastatin back to 10 MG daily. I am open to either option - what would she prefer? Thanks.

## 2023-07-07 ENCOUNTER — TELEPHONE (OUTPATIENT)
Dept: CARDIOLOGY | Facility: CLINIC | Age: 68
End: 2023-07-07
Payer: MEDICARE

## 2023-07-07 DIAGNOSIS — I25.10 CORONARY ARTERY DISEASE INVOLVING NATIVE CORONARY ARTERY OF NATIVE HEART WITHOUT ANGINA PECTORIS: Primary | ICD-10-CM

## 2023-07-07 DIAGNOSIS — E78.5 HYPERLIPIDEMIA: ICD-10-CM

## 2023-07-07 DIAGNOSIS — I10 ESSENTIAL HYPERTENSION, BENIGN: ICD-10-CM

## 2023-07-07 RX ORDER — ROSUVASTATIN CALCIUM 10 MG/1
10 TABLET, COATED ORAL DAILY
Qty: 90 TABLET | Refills: 4 | Status: SHIPPED | OUTPATIENT
Start: 2023-07-07 | End: 2023-10-26

## 2023-07-07 RX ORDER — EVOLOCUMAB 140 MG/ML
140 INJECTION, SOLUTION SUBCUTANEOUS
Qty: 6 ML | Refills: 3 | Status: SHIPPED | OUTPATIENT
Start: 2023-07-07 | End: 2023-08-14

## 2023-07-07 RX ORDER — EVOLOCUMAB 140 MG/ML
140 INJECTION, SOLUTION SUBCUTANEOUS
Qty: 6 ML | Refills: 3 | Status: SHIPPED | OUTPATIENT
Start: 2023-07-07 | End: 2023-07-07

## 2023-07-07 NOTE — TELEPHONE ENCOUNTER
My chart message from patient:  Thank you, Doctor! I still have not gotten my home collection kit from [facility] due to the holidays so I do not know whether targeting cholesterol absorption with Zetia makes the most sense. Once I know my levels of beta-sitosterol and/or capmesterol we can adjust as needed. Under these circumstances, I would like to start Repatha with the hope it will reduce my lp(a) and allows us to reduce the dose of Rosuvastatin. I'd prefer the once every two weeks Repatha injection with the pen called SureClick. Please send both Rx  (Repatha and Rosuvastatin) to my home delivery [facility] pharmacy for a 3-months supply. Thanks!       Called patient to discuss that [facility] is no longer managing injections through their mail order service. Discussed that the first script have to be sent through [facility] to activate the PA system. After that she will use [facility] on [address] in [place].  Patient states she is comfortable giving herself the injection and does not need a training visit. Rx escripted to [facility] for PA and activation. Rx sent.  crestor refill will be decreased to 10mg and [facility]. Rx sent.    PA request routed with request to just do the approval but not to send the medication

## 2023-07-07 NOTE — TELEPHONE ENCOUNTER
My chart from patient:  Dear Dr. Mcconnell, I have recently seen my [place] internist Dr. Edwards (Franc[place]). He ran some blood tests. My lipids have greatly improved - LDL-C 55, Trigs 138, HDL 42. I also checked ApoB - 60 and lp(a) - 46.6. The latter is higher than I would like (genetics) and ApoB ideally would be less than 50. But statin has obliterated almost all LDL lipoproteins it could. Obviously it could not remove/reduce lp(a). According to the studies statins could actually increase lp(a) 10-20%. Since the increase of Rosuvastatin to the highest dose, I have experienced chronic pain in my back, hip and legs. My HbA1C has increased too (but marginally). I don't know if this could be the side effects of the high dose of statin, but cannot completely rule it out. Given that my ApoB could still be lowered and most of that should come from reducing lp(a), should we try Repatha in combination with lower dose of Rosuvastatin? This Wednesday I will test my blood for Lathosterol, Desmosterol, Beta-sitosterol, and Capmesterol to see if I am a hyper-absorber (to consider Zetia). In mid-May I started a low-carb diet and has lost 20lbs with the help of Mounjaro. I'd like to come see you, but your first avail is in Jan 2024. Please check if you can see me earlier (before the 10th of September). Thanks!

## 2023-07-07 NOTE — TELEPHONE ENCOUNTER
My chart messageF:  Radhika Reynolds [facility] Heart Team 2  Phone Number: [phone]     Understood, thank you. About 20-20 minutes ago I received an automated call from [employer] that the prior authorization was approved. Just FYI.       Reply to patient:    We will send the script today to [facility].    Thank you  Team 2 RNs  [phone]

## 2023-07-07 NOTE — TELEPHONE ENCOUNTER
My chart message from patient:  Radhika Reynolds [facility] Heart Team 2  Phone Number: [phone]     Please let me know via a reply to this message that the Rx has been sent (and where). So far, I do not see it being received by [email or web address]. Perhaps you sent to [facility], which is fine. Thanks!       Reply to patient:    Teri, Ms. Reynolds,    The first step in starting repatha is to have a prior authorization completed. This is done through the [facility] pharmacy system.   Once the PA is approved, we can send in the script for the medication. As I tried to discuss on the phone, [facility] no longer provides mail order injectables. They are not an option for repatha.  We will send the script to [facility] when it has been approved.    Thank you  Team 2 RNs  [phone]

## 2023-07-07 NOTE — TELEPHONE ENCOUNTER
[facility] Call Center    Phone Message    May a detailed message be left on voicemail: yes     Reason for Call: Other: Spouse called in with questions regarding evolocumab (REPATHA SURECLICK) 140 MG/ML , please call spouse back for further discussion.      Action Taken: Other: cardiology    Travel Screening: Not Applicable     Thank you!  [facility]

## 2023-07-07 NOTE — TELEPHONE ENCOUNTER
Central Prior Authorization Team   Phone: [phone]    PA Initiation    Medication: Repatha SureClick 140MG/ML auto-injectors    Insurance Company: [employer] - Phone [phone] Fax [phone]  Pharmacy Filling the Rx: [facility] - [place], [place] - [address]  Filling Pharmacy Phone: [phone]  Filling Pharmacy Fax:    Start Date: 7/7/2023

## 2023-07-07 NOTE — TELEPHONE ENCOUNTER
Update from pharmacy team:  Skylar Maldonado Barbara E, RN  Phone Number: [phone]     Hello, Please create a telephone encounter for this Prior Auth request and re-route to the PA pool. We are unable to document and track encounters that are not set up as a telephone encounter.   Thank you     PA for repatha start needed. Patient cannot use [place] pharmacy for the medication; send script to [facility] once approved.

## 2023-07-07 NOTE — TELEPHONE ENCOUNTER
My chart message from patient:  Karyn Reynoldsaakash Reyes [facility] Heart Team 2  Phone Number: [phone]     Please send the Rx to [facility] OR to [facility] (2 pens, 30-day supply). My [employer] Prescription Plan explicitly DOES NOT cover [facility]. Please call my  Constantino @ [phone] for any and all questions related to this matter. Do not call me, please.       Request sent to [facility] to only provide the PA. Will send the script to [facility] once approved.

## 2023-07-08 ENCOUNTER — MYC MEDICAL ADVICE (OUTPATIENT)
Dept: CARDIOLOGY | Facility: CLINIC | Age: 68
End: 2023-07-08
Payer: MEDICARE

## 2023-07-08 DIAGNOSIS — E87.6 HYPOKALEMIA: ICD-10-CM

## 2023-07-10 RX ORDER — METOPROLOL SUCCINATE 25 MG/1
12.5 TABLET, EXTENDED RELEASE ORAL DAILY
Qty: 1 TABLET | Refills: 0 | COMMUNITY
Start: 2023-07-10 | End: 2024-08-06

## 2023-07-10 NOTE — TELEPHONE ENCOUNTER
Prior Authorization Approval    Authorization Effective Date: 6/7/2023  Authorization Expiration Date: 7/6/2024  Medication: Repatha SureClick 140MG/ML auto-injectors    Approved Dose/Quantity:   Reference #:     Insurance Company: [employer] - Phone [phone] Fax [phone]  Expected CoPay:       CoPay Card Available:      Foundation Assistance Needed:    Which Pharmacy is filling the prescription (Not needed for infusion/clinic administered): [facility] - [place], [place] - [address]  Pharmacy Notified:    Patient Notified:

## 2023-07-10 NOTE — TELEPHONE ENCOUNTER
Orders placed for FLP in September along with BMP per refill protocol. Scheduling messaged to arrange.

## 2023-07-10 NOTE — TELEPHONE ENCOUNTER
Per chart review, prescription was already sent to [facility] on 7/7 and patient was made aware via [facility]. Pt will decrease crestor to 10mg daily once on repatha. Patient has return orders for 9/2023, not scheduled. Will message Dr Mcconnell to verify when to recheck FLP.

## 2023-07-10 NOTE — TELEPHONE ENCOUNTER
My Chart message sent to Patient regarding the potassium and Dyazide question.     Dr. Mcconnell Reply - Yes it's fine to stop both for now and keep monitoring BP. Thanks.

## 2023-07-10 NOTE — TELEPHONE ENCOUNTER
Medication list will need to be updated.     Pt wonders if the potassium (K-Tab)  10 MEQ daily should be reduced and if she should continue not to take the Dyazide  - as she already stopped it on her own?     Spoke with Patient with Dr. Mcconnell's recommendations  - to decrease the metoprolol succinate currently taking 25 mg daily and now will start taking 12.5 mg daily.  -Furosemide currently taking 40 mg daily will now decrease to 20 mg daily.   --  Patient states she did try on her own to reduce the Furosemide to 20 mg daily , but gained about 3 lbs of water weight as she was drinking a little more fluid  On those days.  Patient will try decreasing the furosemide to 20 mg daily again.     - Patient on her own also stopped the Dyazide 37.5 -25 MG capsule daily about 4 days and did not note any  changes in her BP's.    --Patient agrees with plan and will continue to monitor and record Wt and BP.s and give update in a week or so.

## 2023-07-10 NOTE — TELEPHONE ENCOUNTER
My Chart message 7-8-23 at 4:48 PM  Requesting  guidance regarding my blood pressure because it's now on the low side. I have lost 20lbs. Still on Mounjaro 2.5mg.      - See BP Values in My Chart note    I stopped taking Triamterene/HCTZ on 7/6/23 after reading 94x58. I occasionally feel lightheaded. I continue to take Furosemide 40mg and Metoprolol Succ Er 25mg. Did I do the right thing or should I make a different adjustment?     Annual OV due in Sept 2023 - not scheduled yet.       Last Dr. Mcconnell OV 9-26-22   IMPRESSION:   1.  Coronary artery disease, status post CABG x1 in 2013 with  drug-eluting stent placement to the left main, circumflex and RCA in 2018.  Remains on aspirin 81 mg daily and clopidogrel 75 mg daily.  2.  Chronic chest discomfort after surgery which is now resolved.   3.  Hypertension.  Blood pressure well controlled on Dyazide 37.5/25 mg daily and metoprolol XL 50 mg daily.  4.  Dyslipidemia.  LDL slightly above goal on rosuvastatin 20 mg daily.  5.  Recent COVID-19 infection.     The patient is doing well overall from a cardiovascular standpoint without evidence of angina or congestive heart failure.  I have recommended she increase her rosuvastatin to 40 mg daily for more effective lipid-lowering.

## 2023-07-15 ENCOUNTER — HEALTH MAINTENANCE LETTER (OUTPATIENT)
Age: 68
End: 2023-07-15

## 2023-07-20 ENCOUNTER — MYC MEDICAL ADVICE (OUTPATIENT)
Dept: CARDIOLOGY | Facility: CLINIC | Age: 68
End: 2023-07-20
Payer: MEDICARE

## 2023-07-20 DIAGNOSIS — I10 ESSENTIAL HYPERTENSION, BENIGN: Primary | ICD-10-CM

## 2023-07-20 DIAGNOSIS — I25.10 CORONARY ARTERY DISEASE INVOLVING NATIVE CORONARY ARTERY OF NATIVE HEART WITHOUT ANGINA PECTORIS: ICD-10-CM

## 2023-07-20 NOTE — TELEPHONE ENCOUNTER
[employer] message sent to patient for update on her BP after stopping dyazide and potassium 7/8/23.

## 2023-07-21 RX ORDER — FUROSEMIDE 20 MG
20 TABLET ORAL DAILY
Qty: 90 TABLET | Refills: 0 | Status: SHIPPED | OUTPATIENT
Start: 2023-07-21 | End: 2023-07-31

## 2023-07-21 NOTE — TELEPHONE ENCOUNTER
Patient's reply routed to Dr César Reyes [facility] Heart Team 2 (supporting Amanda Leon, RN) 3 hours ago (7:47 AM)     I also have the new labs that I would like to send to you. This is the sterols test I did at [facility]. It shows that all four sterols (cholesterol production and absorption markers) are optimal. Therefore, Zetia would not be of benefit to me. I think I should continue with Repatha and Rosu of 10mg. Where can I email that test result to be included in my record? Thanks!      Radhika Reyes [facility] Heart Team 2 (supporting Amanda Leon, CARISSA) 3 hours ago (7:43 AM)     My BP for the last 7 days (10 measurements):  Min : 94x58  Average 104x62  Max: 115x69  In addition to stopping dyazide, I also reduced furosemide to 20mg (from 40). That caused rapid weight gain - in 3 days I gained 6-7 pounds! Obviously it was the water being retained in my body. I since added Hydrochlorothiazide 12.5mg (my 's) and started slowly losing that water. My weight has been steady to going down for the past 3-4 days, so it looks like this regiment is ok for now. Would you please send a new Rx for Furosemide 20 mg to [facility] so that I don't need to cut the pills? Thanks!

## 2023-07-21 NOTE — TELEPHONE ENCOUNTER
Yes, she doesn't need zetia in any case. OK to refill lasix.     Let's get another lipid panel and BMP before her office visit. Thanks.

## 2023-07-21 NOTE — TELEPHONE ENCOUNTER
Mychart reply sent to patient. Patient is already scheduled for labs on 9/11/23. Refill sent for lasix.

## 2023-07-26 ENCOUNTER — APPOINTMENT (OUTPATIENT)
Dept: URBAN - METROPOLITAN AREA CLINIC 255 | Age: 68
Setting detail: DERMATOLOGY
End: 2023-08-01

## 2023-07-26 DIAGNOSIS — L82.1 OTHER SEBORRHEIC KERATOSIS: ICD-10-CM

## 2023-07-26 DIAGNOSIS — D18.0 HEMANGIOMA: ICD-10-CM

## 2023-07-26 DIAGNOSIS — B35.4 TINEA CORPORIS: ICD-10-CM

## 2023-07-26 PROBLEM — D18.01 HEMANGIOMA OF SKIN AND SUBCUTANEOUS TISSUE: Status: ACTIVE | Noted: 2023-07-26

## 2023-07-26 PROCEDURE — OTHER PATIENT SPECIFIC COUNSELING: OTHER

## 2023-07-26 PROCEDURE — OTHER REASSURANCE: OTHER

## 2023-07-26 PROCEDURE — OTHER PRESCRIPTION MEDICATION MANAGEMENT: OTHER

## 2023-07-26 PROCEDURE — 99213 OFFICE O/P EST LOW 20 MIN: CPT

## 2023-07-26 PROCEDURE — OTHER MIPS QUALITY: OTHER

## 2023-07-26 PROCEDURE — OTHER COUNSELING: OTHER

## 2023-07-26 ASSESSMENT — LOCATION SIMPLE DESCRIPTION DERM
LOCATION SIMPLE: RIGHT BREAST
LOCATION SIMPLE: RIGHT LOWER BACK
LOCATION SIMPLE: RIGHT CHEEK

## 2023-07-26 ASSESSMENT — LOCATION DETAILED DESCRIPTION DERM
LOCATION DETAILED: RIGHT PERIAREOLAR BREAST 9-10:00 REGION
LOCATION DETAILED: RIGHT INFERIOR LATERAL MALAR CHEEK
LOCATION DETAILED: RIGHT LATERAL BREAST 7-8:00 REGION
LOCATION DETAILED: RIGHT CENTRAL BUCCAL CHEEK
LOCATION DETAILED: RIGHT INFERIOR MEDIAL MIDBACK

## 2023-07-26 ASSESSMENT — LOCATION ZONE DERM
LOCATION ZONE: FACE
LOCATION ZONE: TRUNK

## 2023-07-26 NOTE — PROCEDURE: REASSURANCE
Include Location In Plan?: No
Additional Note: Patient is aware that these spots are not worrisome and non-cancerous.
Detail Level: Zone

## 2023-07-30 ENCOUNTER — MYC MEDICAL ADVICE (OUTPATIENT)
Dept: CARDIOLOGY | Facility: CLINIC | Age: 68
End: 2023-07-30
Payer: MEDICARE

## 2023-07-30 DIAGNOSIS — I10 ESSENTIAL HYPERTENSION, BENIGN: ICD-10-CM

## 2023-07-30 DIAGNOSIS — I25.10 CORONARY ARTERY DISEASE INVOLVING NATIVE CORONARY ARTERY OF NATIVE HEART WITHOUT ANGINA PECTORIS: ICD-10-CM

## 2023-07-31 RX ORDER — HYDROCHLOROTHIAZIDE 12.5 MG/1
12.5 CAPSULE ORAL DAILY
Qty: 90 CAPSULE | Refills: 0 | Status: SHIPPED | OUTPATIENT
Start: 2023-07-31 | End: 2023-09-15 | Stop reason: DRUGHIGH

## 2023-07-31 RX ORDER — FUROSEMIDE 20 MG
20 TABLET ORAL DAILY
Qty: 90 TABLET | Refills: 0 | Status: SHIPPED | OUTPATIENT
Start: 2023-07-31 | End: 2023-09-12

## 2023-07-31 NOTE — TELEPHONE ENCOUNTER
Patient was called and confirmed she is taking lasix 20 mg daily and Hydrochlorothiazide 12.5mg.  capsule daily. Patient requests prescription to be sent to mail order.

## 2023-07-31 NOTE — TELEPHONE ENCOUNTER
My Chart message received 7-30-23  Dear Dr. Mcconnell, I continue taking Furosemide 20 mg and Hydrochlorothiazide 12.5mg.  It works well and so I would like you to send an Rx to my home delivery [facility] pharmacy. So far, I was borrowing from my 's stock.     Next [facility] OV 9-12-23. With labs before.     Per Last Dr. Mcconnell My Chart message 7-10-23 -   My Chart message sent to Patient regarding the potassium and Dyazide question.      Dr. Mcconnell Reply - Yes it's fine to stop both for now and keep monitoring BP. Thanks.

## 2023-08-02 ENCOUNTER — TELEPHONE (OUTPATIENT)
Dept: CARDIOLOGY | Facility: CLINIC | Age: 68
End: 2023-08-02
Payer: MEDICARE

## 2023-08-02 NOTE — TELEPHONE ENCOUNTER
----- Message from Tony Mcconnell MD sent at 8/1/2023  7:31 AM CDT -----  Regarding: RE: Prescriptions for Lasix and HCTZ 12.5 mg capsule  were sent to Pharmacy today - Next OV Mid Sept. .  any labs prior to upcoming OV?  Let's get a BMP. Thanks.     ----- Message -----  From: Susan Aquino RN  Sent: 7/31/2023   5:07 PM CDT  To: Tony Mcconnell MD; Susan Aquino RN  Subject: Prescriptions for Lasix and HCTZ 12.5 mg cap#    Patient was called and confirmed she is taking lasix 20 mg daily and Hydrochlorothiazide 12.5mg.  capsule daily. ( Has been taking her husbands medications.) Patient requests prescription to be sent to mail order. Doing well on these.     Patient had been on Triamterene 37.5 mg and hydrochlorothiazide 25, mg -prior

## 2023-08-11 ENCOUNTER — MYC MEDICAL ADVICE (OUTPATIENT)
Dept: CARDIOLOGY | Facility: CLINIC | Age: 68
End: 2023-08-11
Payer: MEDICARE

## 2023-08-11 DIAGNOSIS — E78.5 HYPERLIPIDEMIA: ICD-10-CM

## 2023-08-11 NOTE — TELEPHONE ENCOUNTER
My chart message from patient:  Radhika Reynolds [facility] Heart Team 2  Phone Number: [phone]     Dear Dr. Mcconnell, I have switched Repatha to [facility] home delivery pharmacy. They say that before they start sending it, they need to connect with you. Apparently they have tried to reach your office but to no avail. Would you please ask your assistants to reply to them? Or call them if they don't see their outreach messages? Or simply send a new 3-mo supply Rx (with refills) to them    Reply to patient:    Teri Ms. Reynolds,    Please check with the [facility] company again. We have been told for numerous patients and our pharmacy team that [facility] has stopped offering delivery of medications that are injectable and/or need refrigeration.    If they have changed this policy again, we can certainly resend your repatha to that pharmacy.    Thank you for your help  Team 2 R.N.s  [phone]

## 2023-08-14 RX ORDER — EVOLOCUMAB 140 MG/ML
140 INJECTION, SOLUTION SUBCUTANEOUS
Qty: 6 ML | Refills: 0 | Status: SHIPPED | OUTPATIENT
Start: 2023-08-14 | End: 2023-08-23

## 2023-08-17 ENCOUNTER — TRANSFERRED RECORDS (OUTPATIENT)
Dept: HEALTH INFORMATION MANAGEMENT | Facility: CLINIC | Age: 68
End: 2023-08-17
Payer: MEDICARE

## 2023-08-17 LAB
ALT SERPL-CCNC: 20 U/L
AST SERPL-CCNC: 30 U/L (ref 14–36)
CHOLESTEROL (EXTERNAL): 72 MG/DL (ref 50–199)
CREATININE (EXTERNAL): 0.81 MG/DL (ref 0.52–1.04)
GFR ESTIMATED (EXTERNAL): >60 ML/MIN/1.73M2
GLUCOSE (EXTERNAL): 76 MG/DL (ref 70–99)
HBA1C MFR BLD: 4.7 %
HDLC SERPL-MCNC: 46 MG/DL
LDL CHOLESTEROL CALCULATED (EXTERNAL): 11 MG/DL
POTASSIUM (EXTERNAL): 2.9 MMOL/L (ref 3.5–5.1)
TRIGLYCERIDES (EXTERNAL): 76 MG/DL (ref 10–150)

## 2023-08-23 ENCOUNTER — MYC MEDICAL ADVICE (OUTPATIENT)
Dept: CARDIOLOGY | Facility: CLINIC | Age: 68
End: 2023-08-23
Payer: MEDICARE

## 2023-08-23 DIAGNOSIS — E21.3 HYPERPARATHYROIDISM (H): ICD-10-CM

## 2023-08-23 DIAGNOSIS — E78.5 HYPERLIPIDEMIA: ICD-10-CM

## 2023-08-23 DIAGNOSIS — I25.10 CORONARY ARTERY DISEASE INVOLVING NATIVE CORONARY ARTERY OF NATIVE HEART WITHOUT ANGINA PECTORIS: Primary | ICD-10-CM

## 2023-08-23 RX ORDER — EVOLOCUMAB 140 MG/ML
140 INJECTION, SOLUTION SUBCUTANEOUS
Qty: 6 ML | Refills: 0 | Status: SHIPPED | OUTPATIENT
Start: 2023-08-23 | End: 2023-09-12

## 2023-08-23 NOTE — TELEPHONE ENCOUNTER
See mychart message from patient re: labs and repatha. New rx sent to [facility]. Order placed for apolipoprotein B, to be done with other labs on 9/11/23.     Addendum 1310: Patient asking that Vit D and B12 be ordered as well. Routed back to provider.

## 2023-08-24 NOTE — TELEPHONE ENCOUNTER
Labs sent by patient in attachment printed and sent to [facility] for entry to chart.

## 2023-09-11 ENCOUNTER — LAB (OUTPATIENT)
Dept: LAB | Facility: CLINIC | Age: 68
End: 2023-09-11
Payer: MEDICARE

## 2023-09-11 DIAGNOSIS — E78.5 HYPERLIPIDEMIA: ICD-10-CM

## 2023-09-11 DIAGNOSIS — I25.10 CORONARY ARTERY DISEASE INVOLVING NATIVE CORONARY ARTERY OF NATIVE HEART WITHOUT ANGINA PECTORIS: ICD-10-CM

## 2023-09-11 DIAGNOSIS — E21.3 HYPERPARATHYROIDISM (H): ICD-10-CM

## 2023-09-11 DIAGNOSIS — I10 ESSENTIAL HYPERTENSION, BENIGN: ICD-10-CM

## 2023-09-11 LAB
ANION GAP SERPL CALCULATED.3IONS-SCNC: 13 MMOL/L (ref 7–15)
BUN SERPL-MCNC: 17 MG/DL (ref 8–23)
CALCIUM SERPL-MCNC: 10.4 MG/DL (ref 8.8–10.2)
CHLORIDE SERPL-SCNC: 102 MMOL/L (ref 98–107)
CHOLEST SERPL-MCNC: 91 MG/DL
CREAT SERPL-MCNC: 0.85 MG/DL (ref 0.51–0.95)
DEPRECATED HCO3 PLAS-SCNC: 26 MMOL/L (ref 22–29)
EGFRCR SERPLBLD CKD-EPI 2021: 75 ML/MIN/1.73M2
GLUCOSE SERPL-MCNC: 79 MG/DL (ref 70–99)
HDLC SERPL-MCNC: 58 MG/DL
LDLC SERPL CALC-MCNC: 19 MG/DL
NONHDLC SERPL-MCNC: 33 MG/DL
POTASSIUM SERPL-SCNC: 3.4 MMOL/L (ref 3.4–5.3)
SODIUM SERPL-SCNC: 141 MMOL/L (ref 136–145)
TRIGL SERPL-MCNC: 70 MG/DL
VIT B12 SERPL-MCNC: 1074 PG/ML (ref 232–1245)

## 2023-09-11 PROCEDURE — 82306 VITAMIN D 25 HYDROXY: CPT | Performed by: INTERNAL MEDICINE

## 2023-09-11 PROCEDURE — 80061 LIPID PANEL: CPT | Performed by: INTERNAL MEDICINE

## 2023-09-11 PROCEDURE — 80048 BASIC METABOLIC PNL TOTAL CA: CPT | Performed by: INTERNAL MEDICINE

## 2023-09-11 PROCEDURE — 36415 COLL VENOUS BLD VENIPUNCTURE: CPT | Performed by: INTERNAL MEDICINE

## 2023-09-11 PROCEDURE — 82607 VITAMIN B-12: CPT | Performed by: INTERNAL MEDICINE

## 2023-09-11 NOTE — PROGRESS NOTES
~Cardiology Clinic Visit~    Primary Cardiologist: Dr. Mcconnell  Reason for visit: Annual follow up    History of Present Illness  Radhika Reynolds is a very pleasant 68 year old female with a past medical history notable for coronary artery disease and is status post drug-eluting stent placement to the LAD and obtuse marginal in the past.  Previously she had been seen in our Cardiology Clinic until 2015 when she moved to [place].     In summary, in 2013 patient underwent a minimally invasive CABG x1 with LIMA-LAD.  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  Over the past 2 years following the procedure her symptoms slowly resolved and she was taper off anti-inflammatory medications.    A 2018 repeat coronary angiography in [place] was done for chest discomfort.  This showed significant progression of her CAD.  She was treated with MAXIMO to left main, circumflex and right coronary arteries.  Her chest discomfort was relieved following this intervention.    She returned to [place] in early 2018 and was admitted at [facility] in August of that year with left-sided chest discomfort.  Given the high pretest likelihood of progressive coronary artery disease, she again was referred for invasive coronary angiography, and on 8/31/2018 this test demonstrated patent left main, RCA and circumflex stents.  The LIMA graft was small but widely patent.  After that visit in 2018, she moved to [place] due to insurance issues.  Once she turned 67 and was on Medicare, she wished to reestablish care with our group here in [place].    In 2021, she was evaluated at the [facility] in [place], at which point an echocardiogram was normal and medical therapy for CAD was advised.  In follow-up with Dr. Mcconnell in 2022, she was feeling well and continue to recover from COVID-19 infection.  He does have a chronic cough as a result of this.  At that point, from a  cardiovascular standpoint, she denied chest discomfort or shortness of breath.  She had no syncope or presyncope.  She remained fairly active by pursuing speeding in [employer] classes, but had a fall with vertebral fracture which impaired her mobility somewhat.    The panel on 9/23/2022 showed , LDL 83, .  This is while she was on rosuvastatin 20 mg daily.  At her last visit, her statin was increased to 40 mg daily. Repeat lipid panel 5/22/2023 showed , HDL 42, LDL 55, .  Hemoglobin A1c is 6.2.  Her apolipoprotein B is 60, goal <50.  Lipoprotein a is 46.6.    She did not tolerate the higher dose of statin, and is now on a combination of Rosuvastatin 10 mg daily plus Repatha.   Another lipid panel from 9/11/23 showed TC 91, HDL 58, LDL 19.      Interval 09/12/23    She overall is feeling well.  At this point, she is no longer taking furosemide, and now takes hydrochlorothiazide 12.5 mg twice daily.  She has lost 25# over the past year.  Bps at home are closer to the systolic 100-120 range.  She has no lightheadedness or dizziness with this.  She has no chest pain, SOB, FOLEY.  She works with an endocrinologist for her parathyroid.  HR bradycardic at 55.  Again, she is planning to head back to [place] for the winter and will be back in [place] next summer.  __________________________________________________________________         Assessment and Impression:     1.  Coronary artery disease, status post CABG x1 in 2013 with  drug-eluting stent placement to the left main, circumflex and RCA in 2018.  Remains on aspirin 81 mg daily and clopidogrel 75 mg daily.  2.  Chronic chest discomfort after surgery which is now resolved.   3.  Hypertension.  Blood pressure well controlled on hydrochlorothiazide 12.5 mg twice daily and metoprolol XL 25 mg daily.  4.  Dyslipidemia, well controlled.  5.  Recent COVID-19 infection.         Recommendations and Plan:     Reduce Toprol XL to 12.5 mg daily.  Continue all other  medications without changes.  Continue healthy diet and weight management, as well as daily exercise.  Return to clinic in 1-year with Dr. Mcconnell for annual visit.  __________________________________________________________________    Thank you for the opportunity to participate in this pleasant patient's care.    We would be happy to see this patient sooner for any concerns in the meantime.    FELISHA Valencia, CNP   Nurse Practitioner  [facility]    Today's clinic visit entailed:  Review of prior external note(s) from - Outside records from care everywhere  Review of the result(s) of each unique test - cardiac testing, imaging, labs  Prescription drug management    The level of medical decision making during this visit was of moderate complexity.    Orders this Visit:  Orders Placed This Encounter   Procedures    Follow-Up with Cardiology     Orders Placed This Encounter   Medications    Vitamin D-Vitamin K (K2 PLUS D3 PO)    MAGNESIUM PO     Sig: Take 2 tablets by mouth daily    metoprolol succinate ER (TOPROL XL) 25 MG 24 hr tablet     Sig: Take 0.5 tablets (12.5 mg) by mouth daily     Dispense:  45 tablet     Refill:  3    evolocumab (REPATHA SURECLICK) 140 MG/ML prefilled autoinjector     Sig: Inject 1 mL (140 mg) Subcutaneous every 14 days     Dispense:  6 mL     Refill:  3     Medications Discontinued During This Encounter   Medication Reason    acetaminophen (TYLENOL) 500 MG tablet     furosemide (LASIX) 20 MG tablet     evolocumab (REPATHA SURECLICK) 140 MG/ML prefilled autoinjector Reorder (No AVS)     Encounter Diagnoses   Name Primary?    Status post percutaneous transluminal coronary angioplasty     Coronary artery disease involving native coronary artery of native heart without angina pectoris Yes    Essential hypertension, benign     Hyperparathyroidism (H)     Hyperlipidemia LDL goal <70      CURRENT MEDICATIONS:  Current Outpatient Medications   Medication Sig Dispense  Refill    aspirin 81 MG tablet Take 81 mg by mouth daily      clopidogrel (PLAVIX) 75 MG tablet Take 75 mg by mouth every evening      denosumab (PROLIA) 60 MG/ML SOSY injection Inject 60 mg Subcutaneous once Every 6 months.      EPINEPHrine (EPIPEN/ADRENACLICK/OR ANY BX GENERIC EQUIV) 0.3 MG/0.3ML injection 2-pack Inject 0.3 mLs (0.3 mg) into the muscle as needed for anaphylaxis 0.6 mL 1    evolocumab (REPATHA SURECLICK) 140 MG/ML prefilled autoinjector Inject 1 mL (140 mg) Subcutaneous every 14 days 6 mL 3    hydrochlorothiazide (MICROZIDE) 12.5 MG capsule Take 1 capsule (12.5 mg) by mouth daily (Patient taking differently: Take 12.5 mg by mouth 2 times daily) 90 capsule 0    MAGNESIUM PO Take 2 tablets by mouth daily      metoprolol succinate ER (TOPROL XL) 25 MG 24 hr tablet Take 0.5 tablets (12.5 mg) by mouth daily 45 tablet 3    metoprolol succinate ER (TOPROL XL) 25 MG 24 hr tablet Take 12.5 mg by mouth daily 1 tablet 0    NITROGLYCERIN SL Place 0.4 mg under the tongue      potassium chloride (K-TAB,KLOR-CON) 10 MEQ tablet Take 1 tablet (10 mEq) by mouth daily in the PM 90 tablet 1    rosuvastatin (CRESTOR) 10 MG tablet Take 1 tablet (10 mg) by mouth daily 90 tablet 4    Vitamin D-Vitamin K (K2 PLUS D3 PO)       celecoxib (CELEBREX) 200 MG capsule Take 200 mg by mouth daily (Patient not taking: Reported on 9/12/2023)      prednisoLONE (ORAPRED ODT) 30 MG ODT Take 30 mg by mouth daily (Patient not taking: Reported on 9/12/2023)      traMADol (ULTRAM) 50 MG tablet Take 50 mg by mouth At Bedtime (Patient not taking: Reported on 9/12/2023)       ALLERGIES     Allergies   Allergen Reactions    Amoxicillin     Ciprofloxacin     Lisinopril Cough    Norvasc [Amlodipine]     Penicillin G      PAST MEDICAL, SURGICAL, FAMILY, SOCIAL HISTORY:  History was reviewed and updated as needed, see medical record.    Review of Systems:  A 10-point Review Of Systems is otherwise normal except for that which is noted in the HPI  "and interval summary.    Physical Exam:    Vitals: /87   Pulse 55   Ht 1.575 m (5' 2\")   Wt 64.9 kg (143 lb)   BMI 26.16 kg/m    Constitutional: Appears stated age, well nourished, NAD.  Neck: Supple. Carotid pulses full and equal.  Respiratory: Non-labored. Lungs CTAB.  Cardiovascular: RRR, normal S1 and S2. No M/G/R.  No edema.  Musculoskeletal/Extremities: Symmetrical movement to all extremities.  Neurologic: No gross focal deficits. Alert, awake, and oriented to all spheres.  Psychiatric: Affect appropriate. Mentation normal.    Recent Lab Results:  LIPID RESULTS:  Lab Results   Component Value Date    CHOL 91 09/11/2023    CHOL 173 09/17/2018    HDL 58 09/11/2023    HDL 44 (L) 09/17/2018    LDL 19 09/11/2023    LDL 64 09/17/2018    TRIG 70 09/11/2023    TRIG 327 (H) 09/17/2018    CHOLHDLRATIO 3.6 08/17/2015     LIVER ENZYME RESULTS:  Lab Results   Component Value Date    AST 34 03/11/2014    ALT 33 (H) 03/11/2014     CBC RESULTS:  Lab Results   Component Value Date    WBC 8.9 08/30/2018    RBC 4.69 08/30/2018    HGB 14.1 08/30/2018    HCT 40.5 08/30/2018    MCV 86 08/30/2018    MCH 30.1 08/30/2018    MCHC 34.8 08/30/2018    RDW 13.1 08/30/2018     08/30/2018     BMP RESULTS:  Lab Results   Component Value Date     09/11/2023     08/31/2018    POTASSIUM 3.4 09/11/2023    POTASSIUM 3.4 08/31/2018    CHLORIDE 102 09/11/2023    CHLORIDE 107 08/31/2018    CO2 26 09/11/2023    CO2 27 08/31/2018    ANIONGAP 13 09/11/2023    ANIONGAP 8 08/31/2018    GLC 79 09/11/2023    GLC 91 08/31/2018    BUN 17.0 09/11/2023    BUN 16 08/31/2018    CR 0.85 09/11/2023    CR 0.82 08/31/2018    GFRESTIMATED 75 09/11/2023    GFRESTIMATED 71 08/31/2018    GFRESTBLACK 86 08/31/2018    ADONIS 10.4 (H) 09/11/2023    ADONIS 9.0 08/31/2018      A1C RESULTS:  Lab Results   Component Value Date    A1C 5.2 03/15/2013     INR RESULTS:  Lab Results   Component Value Date    INR 1.07 08/30/2018    INR 0.97 03/26/2013 "

## 2023-09-12 ENCOUNTER — OFFICE VISIT (OUTPATIENT)
Dept: CARDIOLOGY | Facility: CLINIC | Age: 68
End: 2023-09-12
Payer: MEDICARE

## 2023-09-12 VITALS
WEIGHT: 143 LBS | HEIGHT: 62 IN | DIASTOLIC BLOOD PRESSURE: 87 MMHG | BODY MASS INDEX: 26.31 KG/M2 | SYSTOLIC BLOOD PRESSURE: 138 MMHG | HEART RATE: 55 BPM

## 2023-09-12 DIAGNOSIS — E78.5 HYPERLIPIDEMIA LDL GOAL <70: ICD-10-CM

## 2023-09-12 DIAGNOSIS — I10 ESSENTIAL HYPERTENSION, BENIGN: ICD-10-CM

## 2023-09-12 DIAGNOSIS — I25.10 CORONARY ARTERY DISEASE INVOLVING NATIVE CORONARY ARTERY OF NATIVE HEART WITHOUT ANGINA PECTORIS: Primary | ICD-10-CM

## 2023-09-12 DIAGNOSIS — E21.3 HYPERPARATHYROIDISM (H): ICD-10-CM

## 2023-09-12 DIAGNOSIS — Z98.61 STATUS POST PERCUTANEOUS TRANSLUMINAL CORONARY ANGIOPLASTY: ICD-10-CM

## 2023-09-12 LAB — DEPRECATED CALCIDIOL+CALCIFEROL SERPL-MC: 102 UG/L (ref 20–75)

## 2023-09-12 PROCEDURE — 99214 OFFICE O/P EST MOD 30 MIN: CPT | Performed by: NURSE PRACTITIONER

## 2023-09-12 RX ORDER — METOPROLOL SUCCINATE 25 MG/1
12.5 TABLET, EXTENDED RELEASE ORAL DAILY
Qty: 45 TABLET | Refills: 3 | Status: SHIPPED | OUTPATIENT
Start: 2023-09-12 | End: 2024-08-06

## 2023-09-12 RX ORDER — EVOLOCUMAB 140 MG/ML
140 INJECTION, SOLUTION SUBCUTANEOUS
Qty: 6 ML | Refills: 3 | Status: SHIPPED | OUTPATIENT
Start: 2023-09-12 | End: 2023-10-25

## 2023-09-12 NOTE — LETTER
9/12/2023    Carlos Edwards MD  [address]  [facility] [address]    RE: Radhika Reynolds       Dear Colleague,     I had the pleasure of seeing Radhika Reynolds in the [facility].              ~Cardiology Clinic Visit~    Primary Cardiologist: Dr. Mcconnell  Reason for visit: Annual follow up    History of Present Illness  Radhika Reynolds is a very pleasant 68 year old female with a past medical history notable for coronary artery disease and is status post drug-eluting stent placement to the LAD and obtuse marginal in the past.  Previously she had been seen in our Cardiology Clinic until 2015 when she moved to [place].     In summary, in 2013 patient underwent a minimally invasive CABG x1 with LIMA-LAD.  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  Over the past 2 years following the procedure her symptoms slowly resolved and she was taper off anti-inflammatory medications.    A 2018 repeat coronary angiography in [place] was done for chest discomfort.  This showed significant progression of her CAD.  She was treated with MAXIMO to left main, circumflex and right coronary arteries.  Her chest discomfort was relieved following this intervention.    She returned to [place] in early 2018 and was admitted at [facility] in August of that year with left-sided chest discomfort.  Given the high pretest likelihood of progressive coronary artery disease, she again was referred for invasive coronary angiography, and on 8/31/2018 this test demonstrated patent left main, RCA and circumflex stents.  The LIMA graft was small but widely patent.  After that visit in 2018, she moved to [place] due to insurance issues.  Once she turned 67 and was on Medicare, she wished to reestablish care with our group here in [place].    In 2021, she was evaluated at the [facility] in [place], at which point an echocardiogram was normal and medical therapy  for CAD was advised.  In follow-up with Dr. Mcconnell in 2022, she was feeling well and continue to recover from COVID-19 infection.  He does have a chronic cough as a result of this.  At that point, from a cardiovascular standpoint, she denied chest discomfort or shortness of breath.  She had no syncope or presyncope.  She remained fairly active by pursuing speeding in [employer] classes, but had a fall with vertebral fracture which impaired her mobility somewhat.    The panel on 9/23/2022 showed , LDL 83, .  This is while she was on rosuvastatin 20 mg daily.  At her last visit, her statin was increased to 40 mg daily. Repeat lipid panel 5/22/2023 showed , HDL 42, LDL 55, .  Hemoglobin A1c is 6.2.  Her apolipoprotein B is 60, goal <50.  Lipoprotein a is 46.6.    She did not tolerate the higher dose of statin, and is now on a combination of Rosuvastatin 10 mg daily plus Repatha.   Another lipid panel from 9/11/23 showed TC 91, HDL 58, LDL 19.      Interval 09/12/23    She overall is feeling well.  At this point, she is no longer taking furosemide, and now takes hydrochlorothiazide 12.5 mg twice daily.  She has lost 25# over the past year.  Bps at home are closer to the systolic 100-120 range.  She has no lightheadedness or dizziness with this.  She has no chest pain, SOB, FOLEY.  She works with an endocrinologist for her parathyroid.  HR bradycardic at 55.  Again, she is planning to head back to [place] for the winter and will be back in [place] next summer.  __________________________________________________________________         Assessment and Impression:     1.  Coronary artery disease, status post CABG x1 in 2013 with  drug-eluting stent placement to the left main, circumflex and RCA in 2018.  Remains on aspirin 81 mg daily and clopidogrel 75 mg daily.  2.  Chronic chest discomfort after surgery which is now resolved.   3.  Hypertension.  Blood pressure well controlled on hydrochlorothiazide 12.5 mg  twice daily and metoprolol XL 25 mg daily.  4.  Dyslipidemia, well controlled.  5.  Recent COVID-19 infection.         Recommendations and Plan:     Reduce Toprol XL to 12.5 mg daily.  Continue all other medications without changes.  Continue healthy diet and weight management, as well as daily exercise.  Return to clinic in 1-year with Dr. Mcconnell for annual visit.  __________________________________________________________________    Thank you for the opportunity to participate in this pleasant patient's care.    We would be happy to see this patient sooner for any concerns in the meantime.    FELISHA Valencia, CNP   Nurse Practitioner  [facility]    Today's clinic visit entailed:  Review of prior external note(s) from - Outside records from care everywhere  Review of the result(s) of each unique test - cardiac testing, imaging, labs  Prescription drug management    The level of medical decision making during this visit was of moderate complexity.    Orders this Visit:  Orders Placed This Encounter   Procedures    Follow-Up with Cardiology     Orders Placed This Encounter   Medications    Vitamin D-Vitamin K (K2 PLUS D3 PO)    MAGNESIUM PO     Sig: Take 2 tablets by mouth daily    metoprolol succinate ER (TOPROL XL) 25 MG 24 hr tablet     Sig: Take 0.5 tablets (12.5 mg) by mouth daily     Dispense:  45 tablet     Refill:  3    evolocumab (REPATHA SURECLICK) 140 MG/ML prefilled autoinjector     Sig: Inject 1 mL (140 mg) Subcutaneous every 14 days     Dispense:  6 mL     Refill:  3     Medications Discontinued During This Encounter   Medication Reason    acetaminophen (TYLENOL) 500 MG tablet     furosemide (LASIX) 20 MG tablet     evolocumab (REPATHA SURECLICK) 140 MG/ML prefilled autoinjector Reorder (No AVS)     Encounter Diagnoses   Name Primary?    Status post percutaneous transluminal coronary angioplasty     Coronary artery disease involving native coronary artery of native heart  without angina pectoris Yes    Essential hypertension, benign     Hyperparathyroidism (H)     Hyperlipidemia LDL goal <70      CURRENT MEDICATIONS:  Current Outpatient Medications   Medication Sig Dispense Refill    aspirin 81 MG tablet Take 81 mg by mouth daily      clopidogrel (PLAVIX) 75 MG tablet Take 75 mg by mouth every evening      denosumab (PROLIA) 60 MG/ML SOSY injection Inject 60 mg Subcutaneous once Every 6 months.      EPINEPHrine (EPIPEN/ADRENACLICK/OR ANY BX GENERIC EQUIV) 0.3 MG/0.3ML injection 2-pack Inject 0.3 mLs (0.3 mg) into the muscle as needed for anaphylaxis 0.6 mL 1    evolocumab (REPATHA SURECLICK) 140 MG/ML prefilled autoinjector Inject 1 mL (140 mg) Subcutaneous every 14 days 6 mL 3    hydrochlorothiazide (MICROZIDE) 12.5 MG capsule Take 1 capsule (12.5 mg) by mouth daily (Patient taking differently: Take 12.5 mg by mouth 2 times daily) 90 capsule 0    MAGNESIUM PO Take 2 tablets by mouth daily      metoprolol succinate ER (TOPROL XL) 25 MG 24 hr tablet Take 0.5 tablets (12.5 mg) by mouth daily 45 tablet 3    metoprolol succinate ER (TOPROL XL) 25 MG 24 hr tablet Take 12.5 mg by mouth daily 1 tablet 0    NITROGLYCERIN SL Place 0.4 mg under the tongue      potassium chloride (K-TAB,KLOR-CON) 10 MEQ tablet Take 1 tablet (10 mEq) by mouth daily in the PM 90 tablet 1    rosuvastatin (CRESTOR) 10 MG tablet Take 1 tablet (10 mg) by mouth daily 90 tablet 4    Vitamin D-Vitamin K (K2 PLUS D3 PO)       celecoxib (CELEBREX) 200 MG capsule Take 200 mg by mouth daily (Patient not taking: Reported on 9/12/2023)      prednisoLONE (ORAPRED ODT) 30 MG ODT Take 30 mg by mouth daily (Patient not taking: Reported on 9/12/2023)      traMADol (ULTRAM) 50 MG tablet Take 50 mg by mouth At Bedtime (Patient not taking: Reported on 9/12/2023)       ALLERGIES     Allergies   Allergen Reactions    Amoxicillin     Ciprofloxacin     Lisinopril Cough    Norvasc [Amlodipine]     Penicillin G      PAST MEDICAL, SURGICAL,  "FAMILY, SOCIAL HISTORY:  History was reviewed and updated as needed, see medical record.    Review of Systems:  A 10-point Review Of Systems is otherwise normal except for that which is noted in the HPI and interval summary.    Physical Exam:    Vitals: /87   Pulse 55   Ht 1.575 m (5' 2\")   Wt 64.9 kg (143 lb)   BMI 26.16 kg/m    Constitutional: Appears stated age, well nourished, NAD.  Neck: Supple. Carotid pulses full and equal.  Respiratory: Non-labored. Lungs CTAB.  Cardiovascular: RRR, normal S1 and S2. No M/G/R.  No edema.  Musculoskeletal/Extremities: Symmetrical movement to all extremities.  Neurologic: No gross focal deficits. Alert, awake, and oriented to all spheres.  Psychiatric: Affect appropriate. Mentation normal.    Recent Lab Results:  LIPID RESULTS:  Lab Results   Component Value Date    CHOL 91 09/11/2023    CHOL 173 09/17/2018    HDL 58 09/11/2023    HDL 44 (L) 09/17/2018    LDL 19 09/11/2023    LDL 64 09/17/2018    TRIG 70 09/11/2023    TRIG 327 (H) 09/17/2018    CHOLHDLRATIO 3.6 08/17/2015     LIVER ENZYME RESULTS:  Lab Results   Component Value Date    AST 34 03/11/2014    ALT 33 (H) 03/11/2014     CBC RESULTS:  Lab Results   Component Value Date    WBC 8.9 08/30/2018    RBC 4.69 08/30/2018    HGB 14.1 08/30/2018    HCT 40.5 08/30/2018    MCV 86 08/30/2018    MCH 30.1 08/30/2018    MCHC 34.8 08/30/2018    RDW 13.1 08/30/2018     08/30/2018     BMP RESULTS:  Lab Results   Component Value Date     09/11/2023     08/31/2018    POTASSIUM 3.4 09/11/2023    POTASSIUM 3.4 08/31/2018    CHLORIDE 102 09/11/2023    CHLORIDE 107 08/31/2018    CO2 26 09/11/2023    CO2 27 08/31/2018    ANIONGAP 13 09/11/2023    ANIONGAP 8 08/31/2018    GLC 79 09/11/2023    GLC 91 08/31/2018    BUN 17.0 09/11/2023    BUN 16 08/31/2018    CR 0.85 09/11/2023    CR 0.82 08/31/2018    GFRESTIMATED 75 09/11/2023    GFRESTIMATED 71 08/31/2018    GFRESTBLACK 86 08/31/2018    ADONIS 10.4 (H) 09/11/2023    " ADONIS 9.0 08/31/2018      A1C RESULTS:  Lab Results   Component Value Date    A1C 5.2 03/15/2013     INR RESULTS:  Lab Results   Component Value Date    INR 1.07 08/30/2018    INR 0.97 03/26/2013       Thank you for allowing me to participate in the care of your patient.      Sincerely,     FELISHA Valencia [facility]  cc:   Tony Mcconnell MD  [address]  [place] [address]

## 2023-09-12 NOTE — PATIENT INSTRUCTIONS
Thank you for your visit with the [facility] today.    Today's Summary     Continue Repatha and Rosuvastatin without changes today.  Continue hydrochlorothiazide without change.  Decrease metoprolol to 12.5 mg (1/2 tablet) daily.  Follow up in 1-year with Dr. Mcconnell for annual review.    If you have questions or concerns, please do not hesitate to call my nursing support team at [phone].    Scheduling phone number: [phone]  [facility] Clinic Number: [phone]    It was a pleasure seeing you today.     FELISHA Valencia, CNP  Nurse Practitioner  [facility]  September 12, 2023  ________________________________________________________

## 2023-09-15 ENCOUNTER — TELEPHONE (OUTPATIENT)
Dept: CARDIOLOGY | Facility: CLINIC | Age: 68
End: 2023-09-15
Payer: MEDICARE

## 2023-09-15 ENCOUNTER — MYC MEDICAL ADVICE (OUTPATIENT)
Dept: CARDIOLOGY | Facility: CLINIC | Age: 68
End: 2023-09-15
Payer: MEDICARE

## 2023-09-15 ENCOUNTER — MYC REFILL (OUTPATIENT)
Dept: CARDIOLOGY | Facility: CLINIC | Age: 68
End: 2023-09-15
Payer: MEDICARE

## 2023-09-15 DIAGNOSIS — I25.10 CORONARY ARTERY DISEASE INVOLVING NATIVE CORONARY ARTERY OF NATIVE HEART WITHOUT ANGINA PECTORIS: ICD-10-CM

## 2023-09-15 DIAGNOSIS — I10 ESSENTIAL HYPERTENSION, BENIGN: Primary | ICD-10-CM

## 2023-09-15 DIAGNOSIS — I10 ESSENTIAL HYPERTENSION, BENIGN: ICD-10-CM

## 2023-09-15 RX ORDER — HYDROCHLOROTHIAZIDE 12.5 MG/1
12.5 CAPSULE ORAL DAILY
Qty: 90 CAPSULE | Refills: 0 | Status: CANCELLED | OUTPATIENT
Start: 2023-09-15

## 2023-09-15 RX ORDER — HYDROCHLOROTHIAZIDE 12.5 MG/1
12.5 CAPSULE ORAL
Qty: 180 CAPSULE | Refills: 0 | Status: SHIPPED | OUTPATIENT
Start: 2023-09-15 | End: 2024-08-06

## 2023-09-15 NOTE — TELEPHONE ENCOUNTER
My chart message from patient re: apolipoprotein B    Could you please check if the result is still pending? Perhaps it was not ordered?     Reply to patient:    Ms. Gail Williamson,    The lab que says the report is still in process. This one does take a long time as it is shipped out for processing.   The results should drop into your my chart the same time as it goes to Dr. Mcconnell for review.    Thank you  Team 2 R.N.s  [phone]

## 2023-09-15 NOTE — TELEPHONE ENCOUNTER
Prescription e scribed to local pharmacy.     Medication Renewal Request  Received: Today  BandarKaryn camaraaakash Reyes [facility] Team 2  Phone Number: [phone]     Refills have been requested for the following medications:        hydrochlorothiazide (MICROZIDE) 12.5 MG capsule [Tony Mcconnell]      Patient Comment: Please send for 30-days supply to our local pharmacy ([facility])    Preferred pharmacy: [id number] [id number] [address] MILENA OV 9-12-23 -   he overall is feeling well.  At this point, she is no longer taking furosemide, and now takes hydrochlorothiazide 12.5 mg twice daily.  She has lost 25# over the past year.  Bps at home are closer to the systolic 100-120 range.  She has no lightheadedness or dizziness with this.  She has no chest pain, SOB, FOLEY.  She works with an endocrinologist for her parathyroid.  HR bradycardic at 55.  Again, she is planning to head back to [place] for the winter and will be back in [place] next summer.     Assessment and Impression:      1.  Coronary artery disease, status post CABG x1 in 2013 with  drug-eluting stent placement to the left main, circumflex and RCA in 2018.  Remains on aspirin 81 mg daily and clopidogrel 75 mg daily.  2.  Chronic chest discomfort after surgery which is now resolved.   3.  Hypertension.  Blood pressure well controlled on hydrochlorothiazide 12.5 mg twice daily and metoprolol XL 25 mg daily.    Last Dr. Mcconnell OV 9-26-22

## 2023-09-21 NOTE — TELEPHONE ENCOUNTER
Radhika Reyes [facility] Team 6 (supporting Casandra Disla APRN CNP)17 hours ago (5:07 PM)     Would you please check one more time on the status of ApoB test? It's highly unusual for the result to take so long.      Called [facility] lab to get status update on lab, Idania (fátima) will look into it and call back.

## 2023-09-22 ENCOUNTER — TELEPHONE (OUTPATIENT)
Dept: CARDIOLOGY | Facility: CLINIC | Age: 68
End: 2023-09-22
Payer: MEDICARE

## 2023-09-22 NOTE — TELEPHONE ENCOUNTER
[facility]    Phone Message    May a detailed message be left on voicemail: yes     Reason for Call: Requesting Results     Name/type of test: Apolipoprotein B lab result  Date of test: 09/11/23  Was test done at a location other than [facility] (Please fill in the location if not [facility])?: No    Action Taken: Other: Cardiology    Travel Screening: Not Applicable    Thank you!  [facility]

## 2023-09-22 NOTE — TELEPHONE ENCOUNTER
Spoke to patient and discussed that [facility] lab was called yesterday about this and they are looking into it, will update her when we have more info. She notes she will be leaving to go out of town in a week and would like this resolved prior to then.     Left a message for Idania in lab for status update.     Addendum 1515: Spoke to lab who states they think the specimen was lost in shipment to [facility] for processing. Dr Mcconnell messaged to review.

## 2023-09-22 NOTE — TELEPHONE ENCOUNTER
Her numbers are previously in the optimal range, but if she would prefer us to repeat it we can.  Thanks.

## 2023-10-16 ENCOUNTER — TRANSFERRED RECORDS (OUTPATIENT)
Dept: HEALTH INFORMATION MANAGEMENT | Facility: CLINIC | Age: 68
End: 2023-10-16
Payer: MEDICARE

## 2023-10-22 ENCOUNTER — MYC MEDICAL ADVICE (OUTPATIENT)
Dept: CARDIOLOGY | Facility: CLINIC | Age: 68
End: 2023-10-22
Payer: MEDICARE

## 2023-10-22 DIAGNOSIS — E78.5 HYPERLIPIDEMIA: ICD-10-CM

## 2023-10-23 NOTE — TELEPHONE ENCOUNTER
My chart message from patient:  Radhika Reynolds [facility] Heart Team 2  Phone Number: [phone]     Teri Mcconnell,  Attached please find my latest lab work (blood and urine) that I did on 10-16-23 in [place] (or you can potentially download the results directly from [employer]). Note the apoB of 30! I am so happy I've reached this physiological level advocated by such distinguished professionals like Dr. Giorgio Vasquez, Dr. Juan Cleaning, Dr. Albert Dent, and others. Also, on the advice of my [place] endocrinologist I started talking Jardiance 10mg. This helps keep my glucose around 80 and A1C at 4.7!  Questions for you:  1. What is your take on me taking Jardiance? I've read about it positive impact on secondary prevention for the ASCVD patients like me.  2. Do you think I can drop baby Aspirin and continue taking Plavix only?  3. Do you think I can lower Rosuva to 5mg or even drop it altogether and stay on Repatha alone? My goal is to keep apoB below 50.  Thanks!                      Reply sent to patient:    Teri, Ms. Gail,    Dr. Mcconnell is out of the clinic until next week. We will update you once his back.    Thank you  Team 2 R.N.s  [phone]    Copy of labs sent for scanning and grid entry.

## 2023-10-25 ENCOUNTER — MYC MEDICAL ADVICE (OUTPATIENT)
Dept: CARDIOLOGY | Facility: CLINIC | Age: 68
End: 2023-10-25
Payer: MEDICARE

## 2023-10-25 DIAGNOSIS — I25.10 CORONARY ARTERY DISEASE INVOLVING NATIVE CORONARY ARTERY OF NATIVE HEART WITHOUT ANGINA PECTORIS: ICD-10-CM

## 2023-10-25 RX ORDER — EVOLOCUMAB 140 MG/ML
140 INJECTION, SOLUTION SUBCUTANEOUS
Qty: 6 ML | Refills: 3 | Status: SHIPPED | OUTPATIENT
Start: 2023-10-25 | End: 2023-11-06

## 2023-10-25 NOTE — TELEPHONE ENCOUNTER
Received refill rqeust from [facility] in [place], [place] for Repatha Sure Click 140mg injection every 14 days.    Last labwork 10\16\23, and just prior to Casandra's visit on 9\11\23.  Patient was las seen 9\12\23 by FELISHA Valencia [facility] Refill Guideline reviewed.  Medication meets criteria for refill.    Tonya Lopez RN on 10/25/2023 at 3:19 PM

## 2023-10-25 NOTE — TELEPHONE ENCOUNTER
The numbers look fantastic. To address the questions:    1) Jardiance definitely has cardiovascular benefits, that are most well established in the setting of heart failure. There is probably some benefit for patients with CAD even in the absence of diabetes and it is well tolerated so I think it's a reasonable drug to continue.    2) I am OK with switching to plavix only at this point.    3) Given the most recent LDL numbers, I'm OK with decreasing the rosuvastatin to 5 MG daily. I would still like her to remain on a statin given the body of evidence supporting this. Thanks.

## 2023-10-25 NOTE — TELEPHONE ENCOUNTER
Dr Mcconnell's message routed to patient via [employer]. Will await patient reply to confirm med changes.

## 2023-10-26 RX ORDER — ROSUVASTATIN CALCIUM 5 MG/1
5 TABLET, COATED ORAL DAILY
Qty: 90 TABLET | Refills: 3 | Status: SHIPPED | OUTPATIENT
Start: 2023-10-26

## 2023-10-26 NOTE — TELEPHONE ENCOUNTER
Radhika Reyes [facility] Heart Team 2 (supporting Tony Mcconnell MD)12 hours ago (7:47 PM)     Thank you for your quick answers and guidance. I will stop baby-aspirin and start the reduced dose of Rosuva. Please send an Rx for 90-days supply with refills of Rosuvastatin 5mg to   [facility] at [address] , [place], [address] [phone]  Thanks!      Med list updated and Rx sent as requested.

## 2023-11-06 RX ORDER — EVOLOCUMAB 140 MG/ML
140 INJECTION, SOLUTION SUBCUTANEOUS
Qty: 6 ML | Refills: 3 | Status: SHIPPED | OUTPATIENT
Start: 2023-11-06

## 2023-12-02 ENCOUNTER — HEALTH MAINTENANCE LETTER (OUTPATIENT)
Age: 68
End: 2023-12-02

## 2023-12-13 ENCOUNTER — MYC MEDICAL ADVICE (OUTPATIENT)
Dept: CARDIOLOGY | Facility: CLINIC | Age: 68
End: 2023-12-13
Payer: MEDICARE

## 2023-12-13 NOTE — TELEPHONE ENCOUNTER
"My chart message from patient:  Radhika Reynolds [facility] Heart Team 2  Phone Number: [phone]     Dear Dr. Mcconnell, I would like to recheck my lipids at the end of December. By then I will have been on the lower dose of Rosuva (5mg) and Repatha for 9 weeks. If my apoB is still below 50 (the last result was 30), I would like to ask your permission to discontinue Rosuva all together. I'd like to remind you that my desmosterol level was less than 0.8mg/L ([facility] test on  07.10.2023). While it's not a settled science, according to Dr. Vasquez (https://[employer]/does-low-cholesterol-rnjxk-fomygqtxk-efetynzuir-part-ii/) \"low desmosterol levels in patients with cognitive symptoms may serve as an indication that cholesterol synthesis is over-suppressed and the statin dose should be decreased (or stopped altogether in favor of a different drug class). We don t like to see absolute concentrations of desmosterol below 0.8 mg/L in our patients.\" I realize that I am taking a very low dose, but still - if I could be at my target apoB without it, I would eliminate it.  Please send the lab order for BOTH - standard lipid panel and apoB - to [facility] at [address] [place] [address] [address] [address]. Phone # [phone]. Thanks!    Will message Dr. Mcconnell to review      "

## 2023-12-13 NOTE — TELEPHONE ENCOUNTER
Called [facility] for FAX #: [phone]. Faxed orders for a fasting lipid panel and apolipoprotein B. Labs to be done at the end of December.    Update sent to patient.

## 2024-05-23 ENCOUNTER — TRANSFERRED RECORDS (OUTPATIENT)
Dept: HEALTH INFORMATION MANAGEMENT | Facility: CLINIC | Age: 69
End: 2024-05-23

## 2024-07-10 ENCOUNTER — ANCILLARY PROCEDURE (OUTPATIENT)
Dept: CT IMAGING | Facility: CLINIC | Age: 69
End: 2024-07-10
Attending: INTERNAL MEDICINE
Payer: MEDICARE

## 2024-07-10 DIAGNOSIS — R19.4 CHANGE IN BOWEL HABIT: ICD-10-CM

## 2024-07-10 DIAGNOSIS — K21.9 CHRONIC GERD: ICD-10-CM

## 2024-07-10 LAB
CREAT BLD-MCNC: 1.1 MG/DL (ref 0.5–1)
EGFRCR SERPLBLD CKD-EPI 2021: 55 ML/MIN/1.73M2

## 2024-07-10 PROCEDURE — 82565 ASSAY OF CREATININE: CPT

## 2024-07-10 PROCEDURE — 74177 CT ABD & PELVIS W/CONTRAST: CPT | Mod: GC | Performed by: RADIOLOGY

## 2024-07-10 RX ORDER — IOPAMIDOL 755 MG/ML
79 INJECTION, SOLUTION INTRAVASCULAR ONCE
Status: COMPLETED | OUTPATIENT
Start: 2024-07-10 | End: 2024-07-10

## 2024-07-10 RX ADMIN — IOPAMIDOL 79 ML: 755 INJECTION, SOLUTION INTRAVASCULAR at 16:26

## 2024-08-06 ENCOUNTER — OFFICE VISIT (OUTPATIENT)
Dept: CARDIOLOGY | Facility: CLINIC | Age: 69
End: 2024-08-06
Payer: MEDICARE

## 2024-08-06 VITALS
HEART RATE: 62 BPM | WEIGHT: 145 LBS | HEIGHT: 62 IN | SYSTOLIC BLOOD PRESSURE: 116 MMHG | BODY MASS INDEX: 26.68 KG/M2 | DIASTOLIC BLOOD PRESSURE: 72 MMHG | OXYGEN SATURATION: 99 %

## 2024-08-06 DIAGNOSIS — I10 ESSENTIAL HYPERTENSION, BENIGN: ICD-10-CM

## 2024-08-06 DIAGNOSIS — M79.605 LEFT LEG PAIN: ICD-10-CM

## 2024-08-06 DIAGNOSIS — I25.10 CORONARY ARTERY DISEASE INVOLVING NATIVE CORONARY ARTERY OF NATIVE HEART WITHOUT ANGINA PECTORIS: Primary | ICD-10-CM

## 2024-08-06 DIAGNOSIS — E78.5 HYPERLIPIDEMIA LDL GOAL <70: ICD-10-CM

## 2024-08-06 PROCEDURE — 99214 OFFICE O/P EST MOD 30 MIN: CPT | Performed by: NURSE PRACTITIONER

## 2024-08-06 RX ORDER — EZETIMIBE 10 MG/1
10 TABLET ORAL DAILY
COMMUNITY
Start: 2024-06-25

## 2024-08-06 RX ORDER — GABAPENTIN 100 MG/1
100 CAPSULE ORAL 3 TIMES DAILY
COMMUNITY

## 2024-08-06 RX ORDER — LOSARTAN POTASSIUM 25 MG/1
25 TABLET ORAL DAILY
COMMUNITY
Start: 2024-06-24

## 2024-08-06 RX ORDER — ACETAMINOPHEN AND CODEINE PHOSPHATE 300; 30 MG/1; MG/1
1 TABLET ORAL EVERY 4 HOURS PRN
COMMUNITY

## 2024-08-06 RX ORDER — DEXLANSOPRAZOLE 60 MG/1
1 CAPSULE, DELAYED RELEASE ORAL DAILY
COMMUNITY
Start: 2024-03-27 | End: 2024-08-06

## 2024-08-06 RX ORDER — SPIRONOLACTONE 25 MG/1
25 TABLET ORAL DAILY
COMMUNITY

## 2024-08-06 NOTE — LETTER
8/6/2024    Carlos Edwards MD  No address on file    RE: Radhika Reynolds       Dear Colleague,     I had the pleasure of seeing Radhika Reynolds in the [facility].              ~Cardiology Clinic Visit~    Primary Cardiologist: Dr. Mcconnell  Reason for visit: Annual follow up     History of Present Illness  Radhika Reynolds is a very pleasant 68 year old female with a past medical history notable for coronary artery disease and is status post drug-eluting stent placement to the LAD and obtuse marginal in the past.  Previously she had been seen in our Cardiology Clinic until 2015 when she moved to [place].      In summary, in 2013 patient underwent a minimally invasive CABG x1 with LIMA-LAD.  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  Over the past 2 years following the procedure her symptoms slowly resolved and she was taper off anti-inflammatory medications.     A 2018 repeat coronary angiography in [place] was done for chest discomfort.  This showed significant progression of her CAD.  She was treated with MAXIMO to left main, circumflex and right coronary arteries.  Her chest discomfort was relieved following this intervention.     She returned to [place] in early 2018 and was admitted at [facility] in August of that year with left-sided chest discomfort.  She was referred for invasive coronary angiography, and on 8/31/2018 this test demonstrated patent left main, RCA and circumflex stents.  The LIMA graft was small but widely patent.  After that visit in 2018, she moved to [place] due to insurance issues.  Once she turned 67 and was on Medicare, she wished to reestablish care with our group here in [place].     In 2021, she was evaluated at the [facility] in [place], at which point an echocardiogram was normal and medical therapy for CAD was advised.  In follow-up with Dr. Mcconnell in 2022, she was feeling well and continue to  recover from COVID-19 infection.  He does have a chronic cough as a result of this.  At that point, from a cardiovascular standpoint, she denied chest discomfort or shortness of breath.  She had no syncope or presyncope.  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     She has not tolerate the higher dose of statin, and is now on a combination of Rosuvastatin 10 mg daily plus Repatha.   FLP 9/11/23 showed TC 91, HDL 58, LDL 19.       Interval 08/06/24    She has no new cardiac concerns since her last visit.  She follows closely with her cardiologist in [place] as well.  She is overall doing well and stable.  She reports left calf discomfort - low likelihood of DVT, but she would like this imaged.  Well controlled BP and HR.  __________________________________________________________________         Assessment and Impression:     1.  Coronary artery disease, status post CABG x1 in 2013 with  drug-eluting stent placement to the left main, circumflex and RCA in 2018.  Remains on clopidogrel 75 mg daily.  2.  Chronic chest discomfort after surgery, resolved.  3.  Hypertension.  Blood pressure well controlled on hydrochlorothiazide 12.5 mg twice daily and metoprolol XL 25 mg daily.  4.  Dyslipidemia, well controlled.  5.  Recent COVID-19 infection.         Recommendations and Plan:     Continue current regimen today without changes.  LLE venous US for DVT r/o.  Ongoing healthy lifestyle, daily activity, and mediterranean diet for cardiovascular health.  Routine follow up with Dr. Mcconnell in 1-year.  Ongoing care with Cardiology team in [place] per their recommendations.  __________________________________________________________________    Thank you for the opportunity to participate in this pleasant patient's care.    We would be happy to see this patient sooner for any concerns in the meantime.    FELISHA Valencia,  CNP   Nurse Practitioner  [facility]    Today's clinic visit entailed:  The following tests were independently interpreted by me as noted in my documentation: labs  Ordering of each unique test  Prescription drug management  The level of medical decision making during this visit was of moderate complexity.  Review of the result(s) of each unique test - cardiac testing, cardiac imaging, labs  Care everywhere reviewed for additional records to facilitate comprehensive patient care.    Orders this Visit:  Orders Placed This Encounter   Procedures     US Lower Extremity Venous Duplex Left     Follow-Up with Cardiology     Orders Placed This Encounter   Medications     ezetimibe (ZETIA) 10 MG tablet     Sig: Take 10 mg by mouth daily     losartan (COZAAR) 25 MG tablet     Sig: Take 25 mg by mouth daily     DISCONTD: DEXILANT 60 MG CPDR CR capsule     Sig: Take 1 capsule by mouth daily     empagliflozin (JARDIANCE) 10 MG TABS tablet     Sig: Take 10 mg by mouth daily     gabapentin (NEURONTIN) 100 MG capsule     Sig: Take 100 mg by mouth 3 times daily     acetaminophen-codeine (TYLENOL #3) 300-30 MG per tablet     Sig: Take 1 tablet by mouth every 4 hours as needed for pain     spironolactone (ALDACTONE) 25 MG tablet     Sig: Take 25 mg by mouth daily     Medications Discontinued During This Encounter   Medication Reason     metoprolol succinate ER (TOPROL XL) 25 MG 24 hr tablet Therapy completed (No AVS)     prednisoLONE (ORAPRED ODT) 30 MG ODT Therapy completed (No AVS)     celecoxib (CELEBREX) 200 MG capsule      hydrochlorothiazide (MICROZIDE) 12.5 MG capsule Therapy completed (No AVS)     metoprolol succinate ER (TOPROL XL) 25 MG 24 hr tablet Therapy completed (No AVS)     traMADol (ULTRAM) 50 MG tablet Therapy completed (No AVS)     potassium chloride (K-TAB,KLOR-CON) 10 MEQ tablet Therapy completed (No AVS)     NITROGLYCERIN SL Therapy completed (No AVS)     DEXILANT 60 MG CPDR CR capsule  Therapy completed (No AVS)     Encounter Diagnoses   Name Primary?     Coronary artery disease involving native coronary artery of native heart without angina pectoris Yes     Left leg pain      Essential hypertension, benign      Hyperlipidemia LDL goal <70      CURRENT MEDICATIONS:  Current Outpatient Medications   Medication Sig Dispense Refill     acetaminophen-codeine (TYLENOL #3) 300-30 MG per tablet Take 1 tablet by mouth every 4 hours as needed for pain       clopidogrel (PLAVIX) 75 MG tablet Take 75 mg by mouth every evening       denosumab (PROLIA) 60 MG/ML SOSY injection Inject 60 mg Subcutaneous once Every 6 months.       empagliflozin (JARDIANCE) 10 MG TABS tablet Take 10 mg by mouth daily       EPINEPHrine (EPIPEN/ADRENACLICK/OR ANY BX GENERIC EQUIV) 0.3 MG/0.3ML injection 2-pack Inject 0.3 mLs (0.3 mg) into the muscle as needed for anaphylaxis 0.6 mL 1     evolocumab (REPATHA SURECLICK) 140 MG/ML prefilled autoinjector Inject 1 mL (140 mg) Subcutaneous every 14 days 6 mL 3     ezetimibe (ZETIA) 10 MG tablet Take 10 mg by mouth daily       gabapentin (NEURONTIN) 100 MG capsule Take 100 mg by mouth 3 times daily       losartan (COZAAR) 25 MG tablet Take 25 mg by mouth daily       MAGNESIUM PO Take 2 tablets by mouth daily       rosuvastatin (CRESTOR) 5 MG tablet Take 1 tablet (5 mg) by mouth daily (Patient taking differently: Take 5 mg by mouth every other day) 90 tablet 3     spironolactone (ALDACTONE) 25 MG tablet Take 25 mg by mouth daily       Vitamin D-Vitamin K (K2 PLUS D3 PO)        ALLERGIES     Allergies   Allergen Reactions     Amoxicillin      Ciprofloxacin      Lisinopril Cough     Norvasc [Amlodipine]      Penicillin G      PAST MEDICAL, SURGICAL, FAMILY, SOCIAL HISTORY:  History was reviewed and updated as needed, see medical record.    Review of Systems:  A 10-point Review Of Systems is otherwise normal except for that which is noted in the HPI and interval summary.    Physical  "Exam:    Vitals: /72   Pulse 62   Ht 1.575 m (5' 2\")   Wt 65.8 kg (145 lb)   SpO2 99%   BMI 26.52 kg/m    Constitutional: Appears stated age, well nourished, NAD.  Neck: Supple. Carotid pulses full and equal.   Respiratory: Non-labored. Lungs CTAB.  Cardiovascular: RRR, normal S1 and S2. No M/G/R.  No edema.  GI: Soft, non-distended, non-tender.  Skin: Warm and dry.   Musculoskeletal/Extremities: Symmetrical movement.  Neurologic: No gross focal deficits. Alert, awake.  Psychiatric: Affect appropriate. Mentation normal.    Recent Lab Results:  LIPID RESULTS:  Lab Results   Component Value Date    CHOL 91 09/11/2023    CHOL 173 09/17/2018    HDL 58 09/11/2023    HDL 44 (L) 09/17/2018    LDL 19 09/11/2023    LDL 64 09/17/2018    TRIG 64 10/16/2023    TRIG 327 (H) 09/17/2018    CHOLHDLRATIO 3.6 08/17/2015     LIVER ENZYME RESULTS:  Lab Results   Component Value Date    AST 34 03/11/2014    ALT 33 (H) 03/11/2014     CBC RESULTS:  Lab Results   Component Value Date    WBC 8.9 08/30/2018    RBC 4.69 08/30/2018    HGB 14.1 08/30/2018    HCT 40.5 08/30/2018    MCV 86 08/30/2018    MCH 30.1 08/30/2018    MCHC 34.8 08/30/2018    RDW 13.1 08/30/2018     08/30/2018     BMP RESULTS:  Lab Results   Component Value Date     09/11/2023     08/31/2018    POTASSIUM 3.4 09/11/2023    POTASSIUM 3.4 08/31/2018    CHLORIDE 106 10/16/2023    CHLORIDE 107 08/31/2018    CO2 26 09/11/2023    CO2 27 08/31/2018    ANIONGAP 13 09/11/2023    ANIONGAP 8 08/31/2018    GLC 79 09/11/2023    GLC 91 08/31/2018    BUN 17.0 09/11/2023    BUN 16 08/31/2018    CR 1.1 (H) 07/10/2024    CR 0.85 09/11/2023    CR 0.82 08/31/2018    GFRESTIMATED 55 (L) 07/10/2024    GFRESTIMATED 71 08/31/2018    GFRESTBLACK 86 08/31/2018    ADONIS 10.4 (H) 09/11/2023    ADONIS 9.0 08/31/2018      A1C RESULTS:  Lab Results   Component Value Date    A1C 5.2 03/15/2013     INR RESULTS:  Lab Results   Component Value Date    INR 1.07 08/30/2018    INR 0.97 " 03/26/2013                     Thank you for allowing me to participate in the care of your patient.      Sincerely,     EFLISHA Valencia [facility]  cc:   Referred Self, MD  No address on file

## 2024-08-06 NOTE — PROGRESS NOTES
~Cardiology Clinic Visit~    Primary Cardiologist: Dr. Mcconnell  Reason for visit: Annual follow up     History of Present Illness  Radhika Reynolds is a very pleasant 68 year old female with a past medical history notable for coronary artery disease and is status post drug-eluting stent placement to the LAD and obtuse marginal in the past.  Previously she had been seen in our Cardiology Clinic until 2015 when she moved to [place].      In summary, in 2013 patient underwent a minimally invasive CABG x1 with LIMA-LAD.  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  Over the past 2 years following the procedure her symptoms slowly resolved and she was taper off anti-inflammatory medications.     A 2018 repeat coronary angiography in [place] was done for chest discomfort.  This showed significant progression of her CAD.  She was treated with MAXIMO to left main, circumflex and right coronary arteries.  Her chest discomfort was relieved following this intervention.     She returned to [place] in early 2018 and was admitted at [facility] in August of that year with left-sided chest discomfort.  She was referred for invasive coronary angiography, and on 8/31/2018 this test demonstrated patent left main, RCA and circumflex stents.  The LIMA graft was small but widely patent.  After that visit in 2018, she moved to [place] due to insurance issues.  Once she turned 67 and was on Medicare, she wished to reestablish care with our group here in [place].     In 2021, she was evaluated at the [facility] in [place], at which point an echocardiogram was normal and medical therapy for CAD was advised.  In follow-up with Dr. Mcconnell in 2022, she was feeling well and continue to recover from COVID-19 infection.  He does have a chronic cough as a result of this.  At that point, from a cardiovascular standpoint, she denied chest discomfort or shortness of breath.   She had no syncope or presyncope.  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     She has not tolerate the higher dose of statin, and is now on a combination of Rosuvastatin 10 mg daily plus Repatha.   FLP 9/11/23 showed TC 91, HDL 58, LDL 19.       Interval 08/06/24    She has no new cardiac concerns since her last visit.  She follows closely with her cardiologist in [place] as well.  She is overall doing well and stable.  She reports left calf discomfort - low likelihood of DVT, but she would like this imaged.  Well controlled BP and HR.  __________________________________________________________________         Assessment and Impression:     1.  Coronary artery disease, status post CABG x1 in 2013 with  drug-eluting stent placement to the left main, circumflex and RCA in 2018.  Remains on clopidogrel 75 mg daily.  2.  Chronic chest discomfort after surgery, resolved.  3.  Hypertension.  Blood pressure well controlled on hydrochlorothiazide 12.5 mg twice daily and metoprolol XL 25 mg daily.  4.  Dyslipidemia, well controlled.  5.  Recent COVID-19 infection.         Recommendations and Plan:     Continue current regimen today without changes.  LLE venous US for DVT r/o.  Ongoing healthy lifestyle, daily activity, and mediterranean diet for cardiovascular health.  Routine follow up with Dr. Mcconnell in 1-year.  Ongoing care with Cardiology team in [place] per their recommendations.  __________________________________________________________________    Thank you for the opportunity to participate in this pleasant patient's care.    We would be happy to see this patient sooner for any concerns in the meantime.    Casandra Disla, APRN, CNP   Nurse Practitioner  [facility]    Today's clinic visit entailed:  The following tests were independently interpreted by me as noted in my documentation:  labs  Ordering of each unique test  Prescription drug management  The level of medical decision making during this visit was of moderate complexity.  Review of the result(s) of each unique test - cardiac testing, cardiac imaging, labs  Care everywhere reviewed for additional records to facilitate comprehensive patient care.    Orders this Visit:  Orders Placed This Encounter   Procedures    US Lower Extremity Venous Duplex Left    Follow-Up with Cardiology     Orders Placed This Encounter   Medications    ezetimibe (ZETIA) 10 MG tablet     Sig: Take 10 mg by mouth daily    losartan (COZAAR) 25 MG tablet     Sig: Take 25 mg by mouth daily    DISCONTD: DEXILANT 60 MG CPDR CR capsule     Sig: Take 1 capsule by mouth daily    empagliflozin (JARDIANCE) 10 MG TABS tablet     Sig: Take 10 mg by mouth daily    gabapentin (NEURONTIN) 100 MG capsule     Sig: Take 100 mg by mouth 3 times daily    acetaminophen-codeine (TYLENOL #3) 300-30 MG per tablet     Sig: Take 1 tablet by mouth every 4 hours as needed for pain    spironolactone (ALDACTONE) 25 MG tablet     Sig: Take 25 mg by mouth daily     Medications Discontinued During This Encounter   Medication Reason    metoprolol succinate ER (TOPROL XL) 25 MG 24 hr tablet Therapy completed (No AVS)    prednisoLONE (ORAPRED ODT) 30 MG ODT Therapy completed (No AVS)    celecoxib (CELEBREX) 200 MG capsule     hydrochlorothiazide (MICROZIDE) 12.5 MG capsule Therapy completed (No AVS)    metoprolol succinate ER (TOPROL XL) 25 MG 24 hr tablet Therapy completed (No AVS)    traMADol (ULTRAM) 50 MG tablet Therapy completed (No AVS)    potassium chloride (K-TAB,KLOR-CON) 10 MEQ tablet Therapy completed (No AVS)    NITROGLYCERIN SL Therapy completed (No AVS)    DEXILANT 60 MG CPDR CR capsule Therapy completed (No AVS)     Encounter Diagnoses   Name Primary?    Coronary artery disease involving native coronary artery of native heart without angina pectoris Yes    Left leg pain     Essential  "hypertension, benign     Hyperlipidemia LDL goal <70      CURRENT MEDICATIONS:  Current Outpatient Medications   Medication Sig Dispense Refill    acetaminophen-codeine (TYLENOL #3) 300-30 MG per tablet Take 1 tablet by mouth every 4 hours as needed for pain      clopidogrel (PLAVIX) 75 MG tablet Take 75 mg by mouth every evening      denosumab (PROLIA) 60 MG/ML SOSY injection Inject 60 mg Subcutaneous once Every 6 months.      empagliflozin (JARDIANCE) 10 MG TABS tablet Take 10 mg by mouth daily      EPINEPHrine (EPIPEN/ADRENACLICK/OR ANY BX GENERIC EQUIV) 0.3 MG/0.3ML injection 2-pack Inject 0.3 mLs (0.3 mg) into the muscle as needed for anaphylaxis 0.6 mL 1    evolocumab (REPATHA SURECLICK) 140 MG/ML prefilled autoinjector Inject 1 mL (140 mg) Subcutaneous every 14 days 6 mL 3    ezetimibe (ZETIA) 10 MG tablet Take 10 mg by mouth daily      gabapentin (NEURONTIN) 100 MG capsule Take 100 mg by mouth 3 times daily      losartan (COZAAR) 25 MG tablet Take 25 mg by mouth daily      MAGNESIUM PO Take 2 tablets by mouth daily      rosuvastatin (CRESTOR) 5 MG tablet Take 1 tablet (5 mg) by mouth daily (Patient taking differently: Take 5 mg by mouth every other day) 90 tablet 3    spironolactone (ALDACTONE) 25 MG tablet Take 25 mg by mouth daily      Vitamin D-Vitamin K (K2 PLUS D3 PO)        ALLERGIES     Allergies   Allergen Reactions    Amoxicillin     Ciprofloxacin     Lisinopril Cough    Norvasc [Amlodipine]     Penicillin G      PAST MEDICAL, SURGICAL, FAMILY, SOCIAL HISTORY:  History was reviewed and updated as needed, see medical record.    Review of Systems:  A 10-point Review Of Systems is otherwise normal except for that which is noted in the HPI and interval summary.    Physical Exam:    Vitals: /72   Pulse 62   Ht 1.575 m (5' 2\")   Wt 65.8 kg (145 lb)   SpO2 99%   BMI 26.52 kg/m    Constitutional: Appears stated age, well nourished, NAD.  Neck: Supple. Carotid pulses full and equal.   Respiratory: " Non-labored. Lungs CTAB.  Cardiovascular: RRR, normal S1 and S2. No M/G/R.  No edema.  GI: Soft, non-distended, non-tender.  Skin: Warm and dry.   Musculoskeletal/Extremities: Symmetrical movement.  Neurologic: No gross focal deficits. Alert, awake.  Psychiatric: Affect appropriate. Mentation normal.    Recent Lab Results:  LIPID RESULTS:  Lab Results   Component Value Date    CHOL 91 09/11/2023    CHOL 173 09/17/2018    HDL 58 09/11/2023    HDL 44 (L) 09/17/2018    LDL 19 09/11/2023    LDL 64 09/17/2018    TRIG 64 10/16/2023    TRIG 327 (H) 09/17/2018    CHOLHDLRATIO 3.6 08/17/2015     LIVER ENZYME RESULTS:  Lab Results   Component Value Date    AST 34 03/11/2014    ALT 33 (H) 03/11/2014     CBC RESULTS:  Lab Results   Component Value Date    WBC 8.9 08/30/2018    RBC 4.69 08/30/2018    HGB 14.1 08/30/2018    HCT 40.5 08/30/2018    MCV 86 08/30/2018    MCH 30.1 08/30/2018    MCHC 34.8 08/30/2018    RDW 13.1 08/30/2018     08/30/2018     BMP RESULTS:  Lab Results   Component Value Date     09/11/2023     08/31/2018    POTASSIUM 3.4 09/11/2023    POTASSIUM 3.4 08/31/2018    CHLORIDE 106 10/16/2023    CHLORIDE 107 08/31/2018    CO2 26 09/11/2023    CO2 27 08/31/2018    ANIONGAP 13 09/11/2023    ANIONGAP 8 08/31/2018    GLC 79 09/11/2023    GLC 91 08/31/2018    BUN 17.0 09/11/2023    BUN 16 08/31/2018    CR 1.1 (H) 07/10/2024    CR 0.85 09/11/2023    CR 0.82 08/31/2018    GFRESTIMATED 55 (L) 07/10/2024    GFRESTIMATED 71 08/31/2018    GFRESTBLACK 86 08/31/2018    ADONIS 10.4 (H) 09/11/2023    ADONIS 9.0 08/31/2018      A1C RESULTS:  Lab Results   Component Value Date    A1C 5.2 03/15/2013     INR RESULTS:  Lab Results   Component Value Date    INR 1.07 08/30/2018    INR 0.97 03/26/2013

## 2024-08-22 ENCOUNTER — HOSPITAL ENCOUNTER (OUTPATIENT)
Dept: ULTRASOUND IMAGING | Facility: CLINIC | Age: 69
Discharge: HOME | End: 2024-08-22
Attending: NURSE PRACTITIONER | Admitting: NURSE PRACTITIONER
Payer: MEDICARE

## 2024-08-22 DIAGNOSIS — M79.605 LEFT LEG PAIN: ICD-10-CM

## 2024-08-22 PROCEDURE — 93971 EXTREMITY STUDY: CPT | Mod: LT

## 2024-09-01 ENCOUNTER — HEALTH MAINTENANCE LETTER (OUTPATIENT)
Age: 69
End: 2024-09-01

## 2024-10-18 ENCOUNTER — MYC REFILL (OUTPATIENT)
Dept: CARDIOLOGY | Facility: CLINIC | Age: 69
End: 2024-10-18
Payer: MEDICARE

## 2024-10-18 DIAGNOSIS — I25.10 CORONARY ARTERY DISEASE INVOLVING NATIVE CORONARY ARTERY OF NATIVE HEART WITHOUT ANGINA PECTORIS: ICD-10-CM

## 2024-10-21 ENCOUNTER — TELEPHONE (OUTPATIENT)
Dept: CARDIOLOGY | Facility: CLINIC | Age: 69
End: 2024-10-21
Payer: MEDICARE

## 2024-10-21 DIAGNOSIS — I25.10 CORONARY ARTERY DISEASE INVOLVING NATIVE CORONARY ARTERY OF NATIVE HEART WITHOUT ANGINA PECTORIS: ICD-10-CM

## 2024-10-21 RX ORDER — EVOLOCUMAB 140 MG/ML
140 INJECTION, SOLUTION SUBCUTANEOUS
Qty: 6 ML | Refills: 3 | Status: SHIPPED | OUTPATIENT
Start: 2024-10-21

## 2024-10-21 NOTE — PROGRESS NOTES
[facility] Refill Guideline reviewed.  Medication meets criteria for refill. KIMBERLEY Frey RN

## 2024-10-21 NOTE — TELEPHONE ENCOUNTER
Central Prior Authorization Team   Phone: [phone]    PA Initiation    Medication: Repatha SureClick 140MG/ML auto-injectors    Insurance Company: [employer] - Phone [phone] Fax [phone]  Pharmacy Filling the Rx: [id number] [place], [place] - [address] AT [facility] OF [address]  Filling Pharmacy Phone: [phone]  Filling Pharmacy Fax:    Start Date: 10/21/2024

## 2024-10-23 NOTE — TELEPHONE ENCOUNTER
Prior Authorization Approval    Authorization Effective Date: 9/21/2024  Authorization Expiration Date: 10/21/2025  Medication: Repatha SureClick 140MG/ML auto-injectors    Approved Dose/Quantity:   Reference #:     Insurance Company: [employer] - Phone [phone] Fax [phone]  Expected CoPay:       CoPay Card Available:      Foundation Assistance Needed:    Which Pharmacy is filling the prescription (Not needed for infusion/clinic administered): [id number] [place], [place] - [address] AT [facility] OF [address]  Pharmacy Notified:  yes  Patient Notified:  yes- Pharmacy will contact patient when ready to /ship

## 2025-05-20 ENCOUNTER — TELEPHONE (OUTPATIENT)
Dept: CARDIOLOGY | Facility: CLINIC | Age: 70
End: 2025-05-20
Payer: MEDICARE

## 2025-05-20 NOTE — TELEPHONE ENCOUNTER
Patient has not been seen by Dr. Mcconnell since 2022 and has follow up with [facility] the last two years. Routed to Dr. Mcconnell to assess if okay for [facility] follow up again this year.

## 2025-05-20 NOTE — TELEPHONE ENCOUNTER
[facility] Call Center    Phone Message    May a detailed message be left on voicemail: yes     Reason for Call: Other: pt was called to schedule with Ali follow up but next opening is not until oct which is after pt has already left for [place]. Pt leaves 9/1/25. Order states Ali's turn vs [facility]. Can team address follow up need with time constraints?     Action Taken: Other: cardiology     Travel Screening: Not Applicable    Thank you!  [facility]        Date of Service:

## 2025-05-21 NOTE — TELEPHONE ENCOUNTER
Patient called to ask how she is feeling. Reports she is stable and doing well. Okay to follow up with [facility]. Hold placed on Casandra Rachel's schedule in [place] on 8/28/25 at 1240. Appointment confirmed with patient and scheduling messaged. Will check with Casandra if any annual labs are needed.

## 2025-05-21 NOTE — TELEPHONE ENCOUNTER
Patient called and informed that no labs are needed prior to follow up appointment. Patient appreciates call and verbalizes understanding.

## 2025-08-28 ENCOUNTER — OFFICE VISIT (OUTPATIENT)
Dept: CARDIOLOGY | Facility: CLINIC | Age: 70
End: 2025-08-28
Payer: MEDICARE

## 2025-08-28 VITALS
WEIGHT: 143.7 LBS | BODY MASS INDEX: 26.44 KG/M2 | HEART RATE: 69 BPM | DIASTOLIC BLOOD PRESSURE: 83 MMHG | SYSTOLIC BLOOD PRESSURE: 127 MMHG | HEIGHT: 62 IN | OXYGEN SATURATION: 98 %

## 2025-08-28 DIAGNOSIS — I25.10 CORONARY ARTERY DISEASE INVOLVING NATIVE CORONARY ARTERY OF NATIVE HEART WITHOUT ANGINA PECTORIS: ICD-10-CM

## 2025-08-28 DIAGNOSIS — I10 ESSENTIAL HYPERTENSION, BENIGN: Primary | ICD-10-CM

## 2025-08-28 DIAGNOSIS — Z98.61 STATUS POST PERCUTANEOUS TRANSLUMINAL CORONARY ANGIOPLASTY: ICD-10-CM

## 2025-08-28 DIAGNOSIS — E78.5 HYPERLIPIDEMIA LDL GOAL <70: ICD-10-CM

## (undated) RX ORDER — NITROGLYCERIN 5 MG/ML
VIAL (ML) INTRAVENOUS
Status: DISPENSED
Start: 2018-08-31

## (undated) RX ORDER — FENTANYL CITRATE 50 UG/ML
INJECTION, SOLUTION INTRAMUSCULAR; INTRAVENOUS
Status: DISPENSED
Start: 2018-08-31